# Patient Record
Sex: FEMALE | Race: WHITE | NOT HISPANIC OR LATINO | Employment: FULL TIME | URBAN - METROPOLITAN AREA
[De-identification: names, ages, dates, MRNs, and addresses within clinical notes are randomized per-mention and may not be internally consistent; named-entity substitution may affect disease eponyms.]

---

## 2023-08-09 ENCOUNTER — OFFICE VISIT (OUTPATIENT)
Dept: FAMILY MEDICINE CLINIC | Facility: CLINIC | Age: 23
End: 2023-08-09
Payer: COMMERCIAL

## 2023-08-09 VITALS
TEMPERATURE: 97.8 F | HEART RATE: 63 BPM | WEIGHT: 111 LBS | OXYGEN SATURATION: 98 % | DIASTOLIC BLOOD PRESSURE: 62 MMHG | RESPIRATION RATE: 18 BRPM | HEIGHT: 63 IN | BODY MASS INDEX: 19.67 KG/M2 | SYSTOLIC BLOOD PRESSURE: 102 MMHG

## 2023-08-09 DIAGNOSIS — Z11.59 ENCOUNTER FOR HEPATITIS C SCREENING TEST FOR LOW RISK PATIENT: ICD-10-CM

## 2023-08-09 DIAGNOSIS — Z30.011 ENCOUNTER FOR PRESCRIPTION OF ORAL CONTRACEPTIVES: ICD-10-CM

## 2023-08-09 DIAGNOSIS — L85.3 DRY SKIN: ICD-10-CM

## 2023-08-09 DIAGNOSIS — Z00.00 WELLNESS EXAMINATION: Primary | ICD-10-CM

## 2023-08-09 DIAGNOSIS — Z13.220 ENCOUNTER FOR LIPID SCREENING FOR CARDIOVASCULAR DISEASE: ICD-10-CM

## 2023-08-09 DIAGNOSIS — Z13.6 ENCOUNTER FOR LIPID SCREENING FOR CARDIOVASCULAR DISEASE: ICD-10-CM

## 2023-08-09 LAB
B-HCG UR QL: NEGATIVE
EXPIRATION DATE: NORMAL
INTERNAL NEGATIVE CONTROL: NORMAL
INTERNAL POSITIVE CONTROL: NORMAL
Lab: NORMAL

## 2023-08-09 PROCEDURE — 99385 PREV VISIT NEW AGE 18-39: CPT

## 2023-08-09 PROCEDURE — 81025 URINE PREGNANCY TEST: CPT

## 2023-08-09 RX ORDER — DROSPIRENONE AND ETHINYL ESTRADIOL 0.03MG-3MG
1 KIT ORAL DAILY
Qty: 28 TABLET | Refills: 12 | Status: SHIPPED | OUTPATIENT
Start: 2023-08-09

## 2023-08-09 RX ORDER — POTASSIUM CITRATE 10 MEQ/1
1 TABLET, EXTENDED RELEASE ORAL DAILY
COMMUNITY
Start: 2023-05-25

## 2023-08-09 RX ORDER — DROSPIRENONE AND ETHINYL ESTRADIOL 0.03MG-3MG
KIT ORAL
COMMUNITY
Start: 2021-02-03 | End: 2023-08-09 | Stop reason: SDUPTHER

## 2023-08-09 NOTE — PATIENT INSTRUCTIONS
Medications as prescribed  Continue current diet and exercise changes  Labs ordered - will call with results   Follow up in 1 year for annual physical, sooner If needed

## 2023-08-09 NOTE — PROGRESS NOTES
"Chief Complaint   Patient presents with    [facility]     Moved and needs a PCP ,Medication refill,  has family history of thyroid issues       Subjective      Bernie Renae is a 22 y.o. who presents to [facility] and for medication refill.  Moved from [place], [place].  Lives at home with .  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    Has chronic kidney stones.  Takes potassium for this. Prescribed by Dr. Salomón Knight in [place] annually sees for urology follow ups.   Dry skin worse the last couple years. Mom and sister recently diagnosed with hypothyroidism.     Runs for exercise, cross fit sometimes  Eat regular diet. Not vegetables very often.  Does take prenatal vitamins       The following portions of the patient's history were reviewed and updated as appropriate: allergies, current medications, past family history, past medical history, past social history, past surgical history, and problem list.    Review of Systems   Constitutional:  Negative for chills and fever.   HENT: Negative.     Eyes:  Negative for blurred vision and double vision.   Respiratory:  Negative for chest tightness and shortness of breath.    Cardiovascular:  Negative for chest pain and palpitations.   Gastrointestinal:  Negative for abdominal pain, blood in stool, constipation and diarrhea.   Endocrine: Negative for cold intolerance, heat intolerance, polydipsia and polyphagia.   Genitourinary:  Negative for dysuria, hematuria and menstrual problem (2 weeks agoi).   Musculoskeletal: Negative.    Skin:  Positive for dry skin.   Neurological:  Negative for dizziness and numbness.   Psychiatric/Behavioral:  Positive for stress (manageable). Negative for sleep disturbance, suicidal ideas and depressed mood. The patient is not nervous/anxious.      Objective   Vital Signs:  /62   Pulse 63   Temp 97.8 øF (36.6 øC)   Resp 18   Ht 160 cm (63\")   Wt 50.3 kg (111 lb)   SpO2 98%   BMI 19.66 kg/mý     BMI is " within normal parameters. No other follow-up for BMI required.        Physical Exam  Vitals and nursing note reviewed.   Constitutional:       General: She is not in acute distress.     Appearance: Normal appearance. She is not ill-appearing.   HENT:      Head: Normocephalic and atraumatic.      Right Ear: Tympanic membrane, ear canal and external ear normal.      Left Ear: Tympanic membrane, ear canal and external ear normal.      Nose: Nose normal.      Mouth/Throat:      Mouth: Mucous membranes are moist.      Pharynx: No posterior oropharyngeal erythema.   Eyes:      General:         Right eye: No discharge.         Left eye: No discharge.      Extraocular Movements: Extraocular movements intact.      Pupils: Pupils are equal, round, and reactive to light.   Neck:      Thyroid: No thyroid mass, thyromegaly or thyroid tenderness.   Cardiovascular:      Rate and Rhythm: Normal rate and regular rhythm.      Pulses: Normal pulses.      Heart sounds: Normal heart sounds.   Pulmonary:      Effort: Pulmonary effort is normal.      Breath sounds: Normal breath sounds.   Abdominal:      General: Bowel sounds are normal. There is no distension.      Palpations: Abdomen is soft.      Tenderness: There is no abdominal tenderness.   Musculoskeletal:         General: No swelling or tenderness. Normal range of motion.      Cervical back: Normal range of motion and neck supple. No tenderness.   Skin:     General: Skin is warm and dry.      Capillary Refill: Capillary refill takes less than 2 seconds.   Neurological:      General: No focal deficit present.      Mental Status: She is alert and oriented to person, place, and time.      Cranial Nerves: No cranial nerve deficit.   Psychiatric:         Mood and Affect: Mood normal.         Behavior: Behavior normal.        Result Review                     Assessment and Plan  Diagnoses and all orders for this visit:    1. Wellness examination (Primary)  -     Comprehensive Metabolic  Panel  -     CBC (No Diff)    2. Encounter for prescription of oral contraceptives  -     Ocella 3-0.03 MG per tablet; Take 1 tablet by mouth Daily.  Dispense: 28 tablet; Refill: 12  -     POC Pregnancy, Urine    3. Encounter for lipid screening for cardiovascular disease  -     Lipid Panel    4. Encounter for hepatitis C screening test for low risk patient  -     Hepatitis C Antibody    5. Dry skin  -     TSH Rfx On Abnormal To Free T4      Discussed wellness with patient including preventative care (vaccines and recommended screenings). Discussed recommendations for diet and exercise.    Refill of oral contraceptives. Patient tolerating well.  Negative urine pregnancy test today.  Labs to screen for thyroid disease and routine labs for monitoring kidney and liver function and lipid screening.  Hep C antibody screening also ordered. Patient is agreeable with all labs ordered.          Patient Instructions   Medications as prescribed  Continue current diet and exercise changes  Labs ordered - will call with results   Follow up in 1 year for annual physical, sooner If needed    Discussed medications prescribed and OTC medications recommended.  Discussed medication safety, possible side effects and how to take or administer medications. Instructed patient to report any adverse reactions, side effects or concerns.     Reviewed physical exam findings and plan with patient who verbalized understanding and agrees with plan of care. Patient was given opportunity to ask questions and all concerns were addressed prior to the conclusion of today's visit.     Follow Up  No follow-ups on file.  Patient was given instructions and counseling regarding her condition or for health maintenance advice. Please see specific information pulled into the AVS if appropriate.     Note to patient: The 21st Century Cures Act makes medical notes like these available to patients in the interest of transparency. However, be advised this is a  medical document. It is intended as peer to peer communication. It is written in medical language and may contain abbreviations or verbiage that are unfamiliar. It may appear blunt or direct. Medical documents are intended to carry relevant information, facts as evident, and the clinical opinion of the provider.

## 2023-08-10 LAB
ALBUMIN SERPL-MCNC: 4.2 G/DL (ref 3.5–5.2)
ALBUMIN/GLOB SERPL: 1.5 G/DL
ALP SERPL-CCNC: 62 U/L (ref 39–117)
ALT SERPL-CCNC: 14 U/L (ref 1–33)
AST SERPL-CCNC: 14 U/L (ref 1–32)
BILIRUB SERPL-MCNC: 0.5 MG/DL (ref 0–1.2)
BUN SERPL-MCNC: 13 MG/DL (ref 6–20)
BUN/CREAT SERPL: 14 (ref 7–25)
CALCIUM SERPL-MCNC: 10.1 MG/DL (ref 8.6–10.5)
CHLORIDE SERPL-SCNC: 100 MMOL/L (ref 98–107)
CHOLEST SERPL-MCNC: 194 MG/DL (ref 0–200)
CO2 SERPL-SCNC: 24.4 MMOL/L (ref 22–29)
CREAT SERPL-MCNC: 0.93 MG/DL (ref 0.57–1)
EGFRCR SERPLBLD CKD-EPI 2021: 88.8 ML/MIN/1.73
ERYTHROCYTE [DISTWIDTH] IN BLOOD BY AUTOMATED COUNT: 11.7 % (ref 12.3–15.4)
GLOBULIN SER CALC-MCNC: 2.8 GM/DL
GLUCOSE SERPL-MCNC: 81 MG/DL (ref 65–99)
HCT VFR BLD AUTO: 41.4 % (ref 34–46.6)
HCV IGG SERPL QL IA: NON REACTIVE
HDLC SERPL-MCNC: 74 MG/DL (ref 40–60)
HGB BLD-MCNC: 14.3 G/DL (ref 12–15.9)
LDLC SERPL CALC-MCNC: 96 MG/DL (ref 0–100)
MCH RBC QN AUTO: 32.2 PG (ref 26.6–33)
MCHC RBC AUTO-ENTMCNC: 34.5 G/DL (ref 31.5–35.7)
MCV RBC AUTO: 93.2 FL (ref 79–97)
PLATELET # BLD AUTO: 345 10*3/MM3 (ref 140–450)
POTASSIUM SERPL-SCNC: 4.5 MMOL/L (ref 3.5–5.2)
PROT SERPL-MCNC: 7 G/DL (ref 6–8.5)
RBC # BLD AUTO: 4.44 10*6/MM3 (ref 3.77–5.28)
SODIUM SERPL-SCNC: 138 MMOL/L (ref 136–145)
TRIGL SERPL-MCNC: 138 MG/DL (ref 0–150)
TSH SERPL DL<=0.005 MIU/L-ACNC: 1.63 UIU/ML (ref 0.27–4.2)
VLDLC SERPL CALC-MCNC: 24 MG/DL (ref 5–40)
WBC # BLD AUTO: 5.04 10*3/MM3 (ref 3.4–10.8)

## 2023-08-16 ENCOUNTER — PATIENT ROUNDING (BHMG ONLY) (OUTPATIENT)
Dept: FAMILY MEDICINE CLINIC | Facility: CLINIC | Age: 23
End: 2023-08-16
Payer: COMMERCIAL

## 2023-08-16 NOTE — PROGRESS NOTES
A My-Chart message has been sent to the patient for PATIENT [facility] with [facility].

## 2023-09-15 ENCOUNTER — OFFICE VISIT (OUTPATIENT)
Dept: FAMILY MEDICINE CLINIC | Facility: CLINIC | Age: 23
End: 2023-09-15
Payer: COMMERCIAL

## 2023-09-15 VITALS
WEIGHT: 112 LBS | BODY MASS INDEX: 19.84 KG/M2 | OXYGEN SATURATION: 99 % | SYSTOLIC BLOOD PRESSURE: 106 MMHG | RESPIRATION RATE: 18 BRPM | TEMPERATURE: 98.2 F | HEIGHT: 63 IN | HEART RATE: 63 BPM | DIASTOLIC BLOOD PRESSURE: 60 MMHG

## 2023-09-15 DIAGNOSIS — J20.9 ACUTE BRONCHITIS, UNSPECIFIED ORGANISM: Primary | ICD-10-CM

## 2023-09-15 LAB
EXPIRATION DATE: NORMAL
FLUAV AG NPH QL: NEGATIVE
FLUBV AG NPH QL: NEGATIVE
INTERNAL CONTROL: NORMAL
Lab: NORMAL

## 2023-09-15 RX ORDER — DEXTROMETHORPHAN HYDROBROMIDE AND PROMETHAZINE HYDROCHLORIDE 15; 6.25 MG/5ML; MG/5ML
5 SYRUP ORAL 4 TIMES DAILY PRN
Qty: 118 ML | Refills: 0 | Status: SHIPPED | OUTPATIENT
Start: 2023-09-15

## 2023-09-15 RX ORDER — GUAIFENESIN 600 MG/1
1200 TABLET, EXTENDED RELEASE ORAL 2 TIMES DAILY
Qty: 30 TABLET | Refills: 0 | Status: SHIPPED | OUTPATIENT
Start: 2023-09-15

## 2023-09-15 RX ORDER — AMOXICILLIN AND CLAVULANATE POTASSIUM 875; 125 MG/1; MG/1
1 TABLET, FILM COATED ORAL 2 TIMES DAILY
Qty: 14 TABLET | Refills: 0 | Status: SHIPPED | OUTPATIENT
Start: 2023-09-15

## 2023-09-15 NOTE — PROGRESS NOTES
"Chief Complaint   Patient presents with    URI     Cough ,chest congestion ,runny nose , sore throat started Monday. Had at home covid test Tuesday was neg. Throats getting better already  but cough is getting worse       Subjective      Bernie Renae is a 22 y.o. who presents for sore throat, congestion. Started 5 days ago.    Cough is hurting in chest now. Negative covid test at home on Tuesday. No shortness of breath but concern that her cough is worsening.       The following portions of the patient's history were reviewed and updated as appropriate: allergies, current medications, past family history, past medical history, past social history, past surgical history, and problem list.    Review of Systems   Constitutional:  Negative for chills, diaphoresis and fever.   HENT:  Positive for congestion, sore throat (feeling better) and trouble swallowing. Negative for drooling, ear discharge, ear pain and swollen glands.    Respiratory:  Positive for cough (chest pain with cough). Negative for chest tightness, shortness of breath, wheezing and stridor.    Gastrointestinal:  Negative for abdominal pain, diarrhea and vomiting.   Musculoskeletal:  Negative for myalgias and neck pain.   Neurological:  Positive for headache. Negative for dizziness.     Objective   Vital Signs:  /60   Pulse 63   Temp 98.2 °F (36.8 °C)   Resp 18   Ht 160 cm (63\")   Wt 50.8 kg (112 lb)   SpO2 99%   BMI 19.84 kg/m²     BMI is within normal parameters. No other follow-up for BMI required.        Physical Exam  Vitals and nursing note reviewed.   Constitutional:       Appearance: She is ill-appearing.   HENT:      Right Ear: Tympanic membrane, ear canal and external ear normal.      Left Ear: Tympanic membrane, ear canal and external ear normal.      Mouth/Throat:      Mouth: Mucous membranes are moist.      Pharynx: Posterior oropharyngeal erythema (mild) present. No oropharyngeal exudate.   Eyes:      Conjunctiva/sclera: " Conjunctivae normal.   Cardiovascular:      Rate and Rhythm: Normal rate and regular rhythm.      Heart sounds: Normal heart sounds.   Pulmonary:      Effort: Pulmonary effort is normal.      Breath sounds: Normal breath sounds.   Neurological:      General: No focal deficit present.      Mental Status: She is alert and oriented to person, place, and time.   Psychiatric:         Mood and Affect: Mood normal.         Behavior: Behavior normal.        Result Review                     Assessment and Plan  Diagnoses and all orders for this visit:    1. Acute bronchitis, unspecified organism (Primary)  -     POC Influenza A / B  -     guaiFENesin (Mucinex) 600 MG 12 hr tablet; Take 2 tablets by mouth 2 (Two) Times a Day.  Dispense: 30 tablet; Refill: 0  -     promethazine-dextromethorphan (PROMETHAZINE-DM) 6.25-15 MG/5ML syrup; Take 5 mL by mouth 4 (Four) Times a Day As Needed for Cough.  Dispense: 118 mL; Refill: 0  -     amoxicillin-clavulanate (AUGMENTIN) 875-125 MG per tablet; Take 1 tablet by mouth 2 (Two) Times a Day.  Dispense: 14 tablet; Refill: 0      Medications as prescribed. Patient was instructed to  the mucinex and promethazine DM today to help with symptoms. Likely viral etiology. Antibiotics prescribed because of upcoming weekend in case patient is worsening/ develops new fever/chills. Patient was instructed to only  / take antibiotic if worsening in the next 48 hours. Cough could last several weeks.  Recommend follow up if no improvement or worsening in the next 7 days.         Discussed medications prescribed and OTC medications recommended.  Discussed medication safety, possible side effects and how to take or administer medications. Instructed patient to report any adverse reactions, side effects or concerns.     Reviewed physical exam findings and plan with patient who verbalized understanding and agrees with plan of care. Patient was given opportunity to ask questions and all concerns  were addressed prior to the conclusion of today's visit.     Follow Up  No follow-ups on file.  Patient was given instructions and counseling regarding her condition or for health maintenance advice. Please see specific information pulled into the AVS if appropriate.     Note to patient: The 21st Century Cures Act makes medical notes like these available to patients in the interest of transparency. However, be advised this is a medical document. It is intended as peer to peer communication. It is written in medical language and may contain abbreviations or verbiage that are unfamiliar. It may appear blunt or direct. Medical documents are intended to carry relevant information, facts as evident, and the clinical opinion of the provider.

## 2023-12-11 ENCOUNTER — E-VISIT (OUTPATIENT)
Dept: FAMILY MEDICINE CLINIC | Facility: TELEHEALTH | Age: 23
End: 2023-12-11
Payer: COMMERCIAL

## 2023-12-11 NOTE — EXTERNAL PATIENT INSTRUCTIONS
Diagnosis   Acute bronchitis (chest cold)   My name is MIRZA Dukes, and I'm a healthcare provider at [facility]. Based on your responses, I see that you have a chest cold, also known as acute bronchitis.   To prevent the spread of illness to others, stay home and away from other people as much as possible while you're sick. When you need to be around other people, consider wearing a face mask.   Here are the main things to know about your current symptoms:    Acute bronchitis gets better on its own within 1 to 3 weeks.    The medications I recommend here will help ease your symptoms.   Based on what you told me in your interview, I haven't prescribed any antibiotics. Antibiotics fight bacteria, not viruses. Your bronchitis is caused by a virus, so antibiotics won't help. They could even make you feel worse as they can cause or worsen nausea, diarrhea, and stomach pain. The following factors suggest that a virus is causing your symptoms:    You've had symptoms for less than 10 days. A bacterial infection is suspected when you've had symptoms longer than 10 days without improvement.    You haven't had a fever. Bacterial infections can cause a high fever.    In addition to your sore throat, you have other cold symptoms like a stuffy nose or cough. This suggests a viral infection, rather than a bacterial infection such as strep throat.   Medications   Your pharmacy   [facility] [address] [place] [id number] [phone]     Prescription   Benzonatate (200mg): Take 1 capsule by mouth 3 times a day as needed for cough. Do not chew or cut these capsules.   Albuterol sulfate HFA (90mcg/puff): Inhale 2 puffs every 6 hours as needed for wheezing. If symptoms are severe, may use as often as every 4 hours.   About your diagnosis   The bronchial tubes carry air to the lungs. Acute bronchitis happens when these tubes become irritated and inflamed. Smoke and air pollution can cause acute  bronchitis, but it's usually caused by a virus. The viruses that cause the common cold can also cause bronchitis.   Common symptoms of acute bronchitis include coughing up mucus or phlegm, wheezing, and chest tightness. Cold symptoms along with fatigue, body aches, difficulty sleeping, or decreased appetite, are also common.   What to expect   If you follow this treatment plan, you should feel better in 1 to 3 weeks.   When to seek care   Call us at [phone]   with any sudden or unexpected symptoms.    Fever that measures over 103F or continues for more than 3 days.    Your headache worsens.    Your shortness of breath becomes severe.    Your sinus pain continues for 10 days or more, without improvement.    Your throat pain becomes worse and makes swallowing extremely difficult or impossible.    More than 5 episodes of diarrhea in a day.    More than 5 episodes of vomiting in a day.    Coughing up red or bloody mucus.    Severe stomach pain.    Symptoms that get better for a few days, and then suddenly get worse.   You mentioned having chest pain. If you develop any of the following, call 911 or go immediately to your nearest emergency room:    A feeling of pressure on your chest    Pain that gets worse with physical activity    Pain that feels like it is radiating to the shoulder, neck, arm, or jaw   Other treatment    Rest! Your body needs rest to recover and fight infection.    Drink plenty of water to stay hydrated.    If your nose or sinuses become very stuffy, try using a Neti Pot to flush them out. Neti Pots are available at any drugstore without a prescription.    Gargle with salt water several times a day to help your throat feel better. Cough drops and throat lozenges may provide extra relief. A teaspoon of honey stirred into warm water or weak tea can help soothe a sore throat and cough.    Avoid smoke and air pollution. Smoke can make infections worse.   Prevention    Avoid close contact with  other people when you're sick.    Cover your mouth and nose when you cough or sneeze. Use a tissue or cough into your elbow. Make sure that used tissues go directly into the trash.    Avoid touching your eyes, nose, or mouth while you're sick.    Wash your hands often, especially after coughing, sneezing, or blowing your nose. If soap and water are not available, use an alcohol-based hand .    If you or someone in your home or workplace is sick, disinfect commonly used items. This includes door handles, tables, computers, remotes, and pens.    Coronavirus (COVID-19) information   Common symptoms of COVID-19 include fever, cough, shortness of breath, fatigue, muscle or body aches, headaches, new loss of sense of taste or smell, sore throat, stuffy or runny nose, nausea or vomiting, and diarrhea. Most people who get COVID-19 have mild symptoms and can rest at home until they get better. Elderly people and those with chronic medical problems may be at risk for more serious complications.   FAQs about the COVID-19 vaccine   There are four authorized COVID-19 vaccines: [employer]'s Desiree Vaccine (J&J/Desiree), Moderna, Novavax, and Pfizer-BioNTech (Pfizer). The J&J/Desiree and Novavax vaccines are approved for use in people aged 18 and older. The Moderna and Pfizer vaccines are approved for those aged 6 months and older. All four are available at no cost. Even if you don't have health insurance, you can still get the COVID-19 vaccine for free.   Which vaccine is the best? Which vaccine should I get?   All four vaccines are highly effective. Even if you get COVID-19 after being vaccinated, all of the vaccines help prevent severe disease, hospitalization, and complications.   Most people should get whichever vaccine is first available to them. However, women younger than 50 years old should consider the rare risk of blood clots with low platelets after vaccination with the J&J/Desiree vaccine. This risk  hasn't been seen with the other three vaccines.   Are the vaccines safe?   Yes. Hundreds of millions of people in the [place] have already safely received COVID-19 vaccines under the most intense safety monitoring in the history of the [place].   Do I need the vaccine if I've already had COVID?   Yes. Vaccination helps protect you even if you've already had COVID.   If you had COVID-19 and had symptoms, wait to get vaccinated until you've recovered and completed your isolation period.   If you tested positive for COVID-19 but did not have symptoms, you can get vaccinated after 5 full days have passed since you had a positive test, as long as you don't develop symptoms.   How many doses of the vaccine do I need?   Visit www.cdc.gov/coronavirus/2019-ncov/vaccines/stay-up-to-date.html   to find out how to stay up to date with your COVID-19 vaccines.   I'm immunocompromised. How many doses of the vaccine do I need?   For information on how immunocompromised people can stay up to date with their COVID-19 vaccines, visit www.cdc.gov/coronavirus/2019-ncov/vaccines/recommendations/immuno.html  .   What are the common side effects of the vaccine?   A sore arm, tiredness, headache, and muscle pain may occur within two days of getting the vaccine and last a day or two. For the Moderna or Pfizer vaccines, side effects are more common after the second dose. People over the age of 55 are less likely to have side effects than younger people.   After I'm up to date on vaccines, can I still get or spread COVID?   Yes, you can still get COVID, but your disease should be milder. And your risk of serious illness, hospitalization, and complications will be much lower, especially if you're up to date. Unfortunately, you can still spread COVID if you've been vaccinated. That's why it's important to follow isolation guidelines if you get sick or test positive.   After I'm up to date on vaccines, can I go back to normal?   You should still wear a  mask indoors in public if:    It's required by laws, rules, regulations, or local guidance.    You have a weakened immune system.    Your age puts you at increased risk of severe disease.    You have a medical condition that puts you at increased risk of severe disease.    Someone in your household has a weakened immune system, is at increased risk for severe disease, or is unvaccinated.    You're in an area of high transmission.   Where can I get a COVID-19 vaccine?   Visit [facility]'s website for more information. To find a COVID-19 vaccination site near you, visit www.[place].gov/  , call [phone]  , or text your zip code to [phone] ([employer]). Message and data rates may apply.   I've had close contact with someone who has COVID. Do I need to quarantine, and if so, for how long?   For the most current answer, including a calculator to determine whether you need to stay home and for how long, visit www.cdc.gov/coronavirus/2019-ncov/your-health/quarantine-isolation.html  .   I've tested positive for COVID. How long do I need to isolate?   For the latest recommendations, including a calculator to determine how long you need to stay home, visit www.cdc.gov/coronavirus/2019-ncov/your-health/quarantine-isolation.html  .   What if I develop symptoms that might be from COVID?   For the latest recommendations on what to do if you're sick, including when to seek emergency care, visit www.cdc.gov/coronavirus/2019-ncov/if-you-are-sick/steps-when-sick.html  .    Flu vaccine information   Who should get a flu vaccine?   Everyone 6 months of age and older should get a yearly flu vaccine.   When should I get vaccinated?   You should get a flu vaccine by the end of October. Once you're vaccinated, it takes about two weeks for antibodies to develop and protect you against the flu. That's why it's important to get vaccinated as soon as possible.   After October, is it too late to get vaccinated?   No. You should still get  vaccinated. As long as the flu viruses are still in your community, flu vaccines will remain available, even into January of next year or later.   Why do I need a flu vaccine EVERY year?   Flu viruses are constantly changing, so flu vaccines are usually updated from one season to the next. Your protection from the flu vaccine also lessens over time.   Is the flu vaccine safe?   Yes. Over the last 50 years, hundreds of millions of Americans have safely received the flu vaccines.   What are the side effects of flu vaccines?   You CANNOT get the flu from a flu vaccine. Common side effects of the flu shot include soreness, redness and/or swelling where the shot was given, low grade fever, and aches. Common side effects of the nasal spray flu vaccine for adults include runny nose, headaches, sore throat, and cough. For children, side effects include wheezing, vomiting, muscle aches, and fever.   Does the flu vaccine increase your risk of getting COVID-19?   No. There is no evidence that getting a flu vaccine increases your risk of getting COVID-19.   Is it safe to get the flu vaccine along with a COVID-19 vaccine?   Yes. It's safe to get the flu vaccine with a COVID-19 vaccine or booster.   Contact your healthcare provider TODAY for details on when and where to get your flu vaccine.   Your provider   Your diagnosis was provided by MIRZA Dukes, a member of your trusted care team at [facility].   If you have any questions, call us at [phone]  .

## 2023-12-11 NOTE — E-VISIT TREATED
Chief Complaint: Cold, flu, COVID, sinus, hay fever, or seasonal allergies   Patient introduction   Patient is 23-year-old female with cough, shortness of breath, sore throat, voice hoarseness, headache, sweats, myalgia, and fatigue that started less than 48 hours ago. Regarding date of symptom onset, patient writes: 12/10/2023.   COVID-19 testing history, vaccination status, and exposure:    Patient was tested for COVID-19 within the last 24 hours. Test result was negative.    Has not received an updated [id number] COVID-19 vaccination (Pfizer-BioNTech or Moderna vaccine after September 12, 2023; or Novavax vaccine after October 3, 2023).    No known exposure to a person with a confirmed or suspected case of COVID-19.    No high-risk (household) exposure to COVID-19 within the last 14 days.   General presentation   Symptoms came on gradually.   Fever:    No fever.   Sinus and nasal symptoms:    Sinus pain or pressure on or around the eyes.    Patient first noticed sinus pain less than 5 days ago.    Sinus pain is worse with Valsalva.    No nasal or sinus congestion.    No nasal discharge.    No itchy nose or sneezing.    No history of unhealed nasal septal ulcer/nasal wound.    No history of antibiotic treatment for sinus infection in the last year.    No history of deviated septum or nasal polyps.   Throat symptoms:    Sore throat.    Has discomfort when swallowing but can swallow liquids and solid foods.    Voice is moderately hoarse. Patient does not believe hoarseness is due to voice strain.   Head and body aches:    Headache described as moderate (4 to 6 on a scale of 1 to 10).    Sweats.    Myalgia.    Fatigue.    No chills.   Cough:    Cough is worse in the morning.    Cough is mildly productive of sputum.    Describes color of sputum as yellow.   Wheezing and shortness of breath:    Wheezing.    Mild shortness of breath that does not affect ADLs.    Despite shortness of breath, patient can speak  normally.    Despite shortness of breath, patient can take a full, deep breath (inhaling for 3 to 4 seconds).    Patient has an Apple Watch or smart watch. They have been wearing the device since their symptoms started.    They have not received any alerts of an elevated heart rate from the device.    Has previously used an albuterol inhaler for URI, bronchitis, or pneumonia.    Has previously used a steroid inhaler for URI, bronchitis, or pneumonia.    Not using an albuterol inhaler for current symptoms because they do not have any available.    No COPD diagnosis.    No asthma diagnosis.   Chest pain:    Has chest pain, but only when coughing.    Chest pain is not the patient's chief complaint.    [place] Heart Score (MHS): 0, low risk of CAD. Assigning 1 point to each of 5 criteria (female >= 65 years old or male >= 55 years, known CAD, pain worse with exercise, pain not reproducible with palpation, and patient assumes pain is coming from their heart), the MHS is a validated clinical decision rule used to rule out coronary artery disease in primary care patients with chest pain.   Ear symptoms:    None.   Dizziness:    Mild dizziness that does not interfere with daily activities.   Allergies:    Patient has known seasonal allergies.    Patient does not think symptoms are allergy-related.   Flu exposure:    No recent known exposure to a person with a confirmed flu diagnosis.    Has not had a flu vaccine this season.   Patient is taking over-the-counter medications for current symptoms, including acetaminophen, guaifenesin, and phenylephrine.   Review of red flags/alarm symptoms:    No severe shortness of breath.    No inability to repeat a sentence without stopping for breath.    No inability to take a full breath lasting 3 to 4 seconds.    No changes in alertness or awareness.    No symptoms suggesting airway obstruction.    No symptoms suggesting intracranial hemorrhage.    No decreased urination.    "Pregnancy/menstrual status/breastfeeding:    Not pregnant.    Not breastfeeding.    Regarding date of last menstrual period, patient writes: Currently on period so 12/11/23.   Self-exam:    Height: 5' 3\"    Weight: 108 lbs    No tonsillar edema.    Tonsils appear normal.    No palatal petechiae.    Neck lymph nodes feel normal.   Recent antibiotic use:    Has not taken antibiotics for similar symptoms within the past month.   Current medications   Currently taking potassium citrate 10 MEQ (1080 MG) CR tablet, Ocella 3-0.03 MG per tablet, and Alive Prenatal Gummies.   Medication allergies   None.   Medication contraindication review   No history of anaphylactic reaction to beta-lactam antibiotics; aspirin triad; blood dyscrasia; bone marrow depression; catecholamine-releasing paraganglioma; coronary artery disease; coagulation disorder; congenital long QT syndrome; depression; electrolyte abnormalities; fungal infection; GI bleeding; GI obstruction; G6PD deficiency; heart arrhythmia; hypertension; mononucleosis; myasthenia; recent myocardial infarction; NSAID-induced asthma/urticaria; Parkinson's disease; pheochromocytoma; porphyria; Reye syndrome; seizure disorder; ulcerative colitis; and urinary retention.   No history of metoclopramide-associated dystonic reaction and tardive dyskinesia.   No known history of amoxicillin-clavulanate-associated cholestatic jaundice or hepatic impairment.   No known history of azithromycin-associated cholestatic jaundice or hepatic impairment.   Past medical history   Immune conditions:   No immunocompromising conditions.   No history of cancer.   Social history   Never smoked tobacco.   Patient-submitted comments   Patient was asked if they had anything to add about their symptoms. Patient writes: Cough is forceful and causes chest pain. Can feel popping and cracking in my chest when moving. Hard to catch breath when coughing. .   Patient did not request an excuse note.   " Assessment   Acute bronchitis.   This is the likely diagnosis based on patient's interview responses, including:    Symptom profile    Gradual onset of symptoms   Plan   Medications:    benzonatate 200 mg capsule RX 200mg 1 cap PO tid PRN 7d for cough. Do not chew or cut these capsules. Amount is 21 cap.    albuterol sulfate HFA 90 mcg/actuation aerosol inhaler RX 90mcg/puff 2 puffs inhaled q6h PRN 10d for wheezing. If symptoms are severe, may use as often as every 4 hours. Amount is 1 each.   The patient's prescriptions will be sent to:   [facility] [address] [place] [id number]   Phone: [phone]     Fax: [phone]   Education:    Condition and causes    Prevention    Treatment and self-care    When to call provider   ----------   Electronically signed by MIRZA Dukes on 2023-12-11 at 17:41PM   ----------   Patient Interview Transcript:   Which of these symptoms are bothering you? Select all that apply.    Cough    Shortness of breath    Sore throat    Hoarse voice or loss of voice    Headache    Sweats    Muscle or body aches    Fatigue or tiredness   Not selected:    Stuffed-up nose or sinuses    Runny nose    Itchy or watery eyes    Itchy nose or sneezing    Loss of smell or taste    Fever    Chills    Nausea or vomiting    Diarrhea    I don't have any of these symptoms   When did your current symptoms start? Select one.    Less than 48 hours ago   Not selected:    3 to 5 days ago    6 to 9 days ago    10 to 14 days ago    2 to 3 weeks ago    3 to 4 weeks ago    More than a month ago   Do you know the exact date your symptoms started? If so, enter the date as MM/DD/YY. Select one.    Yes (specify): 12/10/2023   Not selected:    No   Did your symptoms come on suddenly or gradually? Select one.    Gradually   Not selected:    Suddenly    I'm not sure   Since your current symptoms started, have you been tested for COVID-19? This includes home self-tests as well as nose swab or  "saliva tests done at a doctor's office, lab, or testing site. Select one.    Yes   Not selected:    No   When was your most recent COVID-19 test? Select one.    Within the last 24 hours   Not selected:    Within the last week (specify date as MM/DD/YY)    7 to 14 days ago    15 to 30 days ago    More than 1 month ago   What was the result of your most recent COVID-19 test? Select one.    Negative   Not selected:    Positive   Has anyone in your household tested positive for COVID-19 in the past 14 days? Select one.    No   Not selected:    Yes   In the last 14 days, have you had close contact with someone who has COVID-19? \"Close contact\" means any of these: - Caring for someone with COVID-19. - Being within 6 feet of someone with COVID-19 for a total of at least 15 minutes over a 24-hour period. For example, three 5-minute exposures for a total of 15 minutes. - Being in direct contact with respiratory droplets from someone with COVID-19 (being coughed on, kissing, sharing utensils). Select one.    No, not that I know of   Not selected:    Yes, a confirmed case    Yes, a suspected case   Have you gotten the [id number] updated COVID-19 vaccine? This means either the updated Pfizer-BioNTech or Moderna vaccine after September 12, 2023; or the updated Novavax vaccine after October 3, 2023. Select one.    No   Not selected:    Yes   How would you describe your shortness of breath? Sometimes shortness of breath can be a sign of a more serious condition. Select one.    Mild (about what I normally have with a cold)   Not selected:    Moderate (it's bad, but I can still do simple things like get dressed, bathe, or comb my hair)    Severe (it's so bad that I can't do simple things like get dressed, bathe, or comb my hair)   We'd like to find out more about your shortness of breath. Try to say the following sentence out loud: \"I went to the store today to buy bread, milk, and eggs.\" Can you get to the end of the sentence " without stopping for breath? If not, you may need emergency care.    Yes   Not selected:    No   Are you able to take a full, deep breath? A full, deep breath means inhaling for 3 to 4 seconds. If you can't do this, you may need to seek emergency care. Select one.    Yes   Not selected:    No   Since your symptoms started, have you felt dizzy? Select one.    Yes, but I can still do my regular daily activities   Not selected:    Yes, and it makes it hard to stand, walk, or do daily activities    No   Do you have any of these devices at home? Select all that apply.    Apple Watch or other smart watch   Not selected:    A home pulse oximeter    Home blood pressure monitor    FitBit or other smart wristband    Other wearable device    No   Have you been wearing the device since your symptoms started? Select one.    Yes   Not selected:    No   Has the device alerted you about a higher than normal heart rate? This doesn't include alerts received during exercise. Select one.    No   Not selected:    Yes, 120 beats per minute or higher    Yes, but less than 120 beats per minute   You mentioned having a headache. On a scale of 1 to 10, how severe is your headache pain? Select one.    Moderate (4 to 6)   Not selected:    Mild (1 to 3)    Severe (7 to 9)    Unbearable (10)    The worst headache of my life (10+)   Do you cough so hard that it's made you gag or vomit? By gag, we mean has your coughing made you choke or dry heave? Select all that apply.    Yes, my coughing has made me gag   Not selected:    Yes, my coughing has made me vomit    No   When is your cough the worst? Select all that apply.    In the morning, or when I wake up   Not selected:    During the day    At nighttime, or while I'm sleeping    I haven't noticed a difference depending on time of day   Are you coughing up mucus or phlegm? Select one.    Yes, a little   Not selected:    No, my cough is dry    Yes, a lot   What color is most of the mucus or phlegm  that you're coughing up? Select one.    Yellow or yellowish   Not selected:    Clear    White/frothy    Green or greenish    Red or pink    I'm not sure   Do you feel sinus pain or pressure in any of these areas?    Around my eyes   Not selected:    In my forehead    Behind my nose    In my cheeks    In my upper teeth or jaw    No   When did you first notice your sinus pain or pressure? Select one.    Less than 5 days ago   Not selected:    5 to 9 days ago    10 to 14 days ago    2 to 4 weeks ago    1 month ago or longer   Does coughing, sneezing, or leaning forward make your sinuses feel worse? Select one.    Yes   Not selected:    No   Can you swallow liquids and solid foods? A sore throat may be painful when swallowing, but it shouldn't prevent you from swallowing. Select one.    Yes, but it's uncomfortable   Not selected:    Yes, with ease    Yes, but it's painful    It's hard to swallow anything because it feels like liquids and food get stuck in my throat    No, I can't swallow anything, liquid or solid foods   Is your throat pain worse on one side than the other? Select one.    No, it's the same on both sides   Not selected:    Yes, it's worse on the right side    Yes, it's worse on the left side   Do you have chest pain? You might also feel it as discomfort, aching, tightness, or squeezing in the chest. Select one.    Yes   Not selected:    No   Which of these is true of your chest pain? Select one.    My chest hurts only when I cough   Not selected:    My chest hurts even when I'm not coughing   Have you urinated at least 3 times in the last 24 hours? Select one.    Yes   Not selected:    No   Changes in alertness or awareness may mean you need emergency care. Since your symptoms started, have you had any of these? Select all that apply.    None of the above   Not selected:    Confusion    Slurred speech    Not knowing where you are or what day it is    Difficulty staying conscious    Fainting or passing  out   Do your symptoms include a whistling sound, or wheezing, when you breathe? Select one.    Yes   Not selected:    No   Early in this interview, you told us you were hoarse or you'd lost your voice. How would you describe the changes to your voice? Select one.    It's harder than usual to talk   Not selected:    It just sounds a little raspy    I can barely talk at all   Is it possible that you strained your voice? Singing, yelling, or talking more or louder than usual can cause voice strain. Select one.    No, not that I know of   Not selected:    Yes   Do you have any of these symptoms in your ear(s)? Select all that apply.    None of the above   Not selected:    Pain    Pressure    Fullness    Crackling or popping    Plugged or blocked sensation   Are your tonsils larger than usual?    No, not that I can tell   Not selected:    Yes    I've had my tonsils removed   Is there any white or yellow pus on your tonsils?    No, not that I can see   Not selected:    Yes   Are there red spots on the roof of your mouth or the back of your throat?    No, not that I can see   Not selected:    Yes   Are your glands/lymph nodes swollen, or does it hurt when you touch them?    No, not that I can tell   Not selected:    Yes   In the past week, has anyone around you (such as at school, work, or home) had a confirmed diagnosis of the flu? A confirmed diagnosis means that a nose swab was done to verify a flu infection. Select all that apply.    No, not that I know of   Not selected:    I live with someone who has the flu    I've been within touching distance of someone who has the flu    I've walked by, or sat about 3 feet away from, someone who has the flu    I've been in the same building as someone who has the flu   Have you ever been diagnosed with asthma? Select one.    No   Not selected:    Yes   Have you ever been prescribed albuterol to use for wheezing, cough, or shortness of breath caused by a cold, bronchitis, or  pneumonia? Albuterol (ProAir, Proventil, Ventolin) is prescribed as an inhaler or a solution to be used with a nebulizer machine. Select one.    Yes   Not selected:    No, not that I know of   Have you ever been prescribed a steroid inhaler to use for wheezing, cough, or shortness of breath caused by a cold, bronchitis, or pneumonia? Some examples of steroid inhalers include Pulmicort, Flovent, Qvar, and Alvesco. Select one.    Yes   Not selected:    No, not that I know of   Have you used albuterol for your current symptoms? This includes both albuterol inhalers and albuterol solution in a nebulizer machine. Select one.    No, I don't have any, or it's    Not selected:    Yes    No, I don't feel like I need it   Do you need a refill of albuterol? Select one.    No   Not selected:    Yes   Have you ever been diagnosed with chronic obstructive pulmonary disease (COPD)? Select one.    No, not that I know of   Not selected:    Yes   In the past month, have you taken antibiotics for similar symptoms? Examples of antibiotics include amoxicillin, amoxicillin-clavulanate (Augmentin), penicillin, cefdinir (Omnicef), doxycycline, and clindamycin (Cleocin). Select one.    No   Not selected:    Yes (specify)   In the last year, how many times were you treated with antibiotics for a sinus infection? Select one.    None   Not selected:    1 to 3 times    4 or more times   Have you been diagnosed with a deviated septum or nasal polyps? The nose is divided into two nostrils by the septum. A crooked septum is called a deviated septum. Nasal polyps are growths inside the nose or sinuses. Select one.    No, not that I know of   Not selected:    Yes, but I had surgery to treat them    Yes, I have a deviated septum    Yes, I have nasal polyps    Yes, I have a deviated septum and nasal polyps   Do you have a sore inside your nose that won't heal? Select one.    No, not that I know of   Not selected:    Yes   Do you have allergies  (pollen, dust mites, mold, animal dander)? Select one.    Yes   Not selected:    No, not that I know of   What kind of allergies do you have? Select all that apply.    Seasonal allergies (hay fever)   Not selected:    Perennial, or year-round, allergies (hay fever)    Pet allergies    Dust allergies    None of the above    I'm not sure   Do you think your symptoms could be allergy-related? Select one.    No   Not selected:    Yes    I'm not sure   Have you had a flu shot this season? Select one.    No   Not selected:    Yes, less than 2 weeks ago    Yes, 2 to 4 weeks ago    Yes, 1 to 3 months ago    Yes, 3 to 6 months ago    Yes, more than 6 months ago   Are you pregnant? Select one.    No   Not selected:    Yes   When was your last menstrual period? If you don't currently have periods or no longer have periods, please briefly explain.    Currently on period so 23   Within the last 2 weeks, have you: - Given birth - Had a miscarriage - Had a pregnancy loss - Had an  Being postpartum (live birth or loss) within the last 2 weeks increases your risk of flu complications. Select one.    No   Not selected:    Yes   Are you breastfeeding? Select one.    No   Not selected:    Yes   The flu and COVID-19 can be more serious for people in certain groups. The next few questions help us figure out if you or anyone you live with is at higher risk for complications from these infections. Do any of these statements apply to you? Select all that apply.    None of the above   Not selected:    I'm     I'm     I'm Black    I'm  or    Do you smoke tobacco? Select one.    No   Not selected:    Yes, every day    Yes, some days    No, I quit   Do you have a mostly inactive lifestyle? Answer yes if all of these are true: - You spend at least 6 hours a day sitting or lying down - You get less than 2 and a half hours per week of moderate exercise such as walking fast - You get less than  1 hour and 15 minutes per week of intense exercise such as jogging or running Select one.    No   Not selected:    Yes   Do you have any of these conditions? Select all that apply.    None of the above   Not selected:    Chronic lung disease, such as cystic fibrosis or interstitial fibrosis    Heart disease, such as congenital heart disease, congestive heart failure, or coronary artery disease    Disorder of the brain, spinal cord, or nerves and muscles, such as dementia, cerebral palsy, epilepsy, muscular dystrophy, or developmental delay    Metabolic disorder or mitochondrial disease    Cerebrovascular disease, such as stroke or another condition affecting the blood vessels or blood supply to the brain    Down syndrome    Mood disorder, including depression or schizophrenia spectrum disorders    Substance use disorder, such as alcohol, opioid, or cocaine use disorder    Tuberculosis    Primary immunodeficiency   Do you live in a group care setting? Examples include: - Nursing home - Residential care - Psychiatric treatment facility - Group home - [facility] - Board and care home - Homeless shelter - Foster care setting Select one.    No   Not selected:    Yes   Are you a healthcare worker? Select one.    No   Not selected:    Yes   People with a very high body mass index (BMI) are at higher risk for developing complications from the flu and severe illness from COVID-19. To determine your BMI, we need to know your weight and height. Please enter your weight (in pounds).    Weight   Please enter your height.    Height   Do you have any of these conditions that can affect the immune system? Scroll to see all options. Select all that apply.    None of these   Not selected:    History of bone marrow transplant    Chronic kidney disease    Chronic liver disease (including cirrhosis)    HIV/AIDS    Inflammatory bowel disease (Crohn's disease or ulcerative colitis)    Lupus    Moderate to severe plaque psoriasis     Multiple sclerosis    Rheumatoid arthritis    Sickle cell anemia    Alpha or beta thalassemia    History of solid organ transplant (kidney, liver, or heart)    History of spleen removal    An autoimmune disorder not listed here (specify)    A condition requiring treatment with long-term use of oral steroids (such as prednisone, prednisolone, or dexamethasone) (specify)   Have you ever been diagnosed with cancer? Select one.    No   Not selected:    Yes, I have cancer now    Yes, but I'm in remission   Do any of these apply to you? Select all that apply.    None of the above   Not selected:    I've been hospitalized within the last 5 days    I have diabetes    I'm in close contact with a child in    The flu and COVID-19 can be more serious for people in certain groups. Do any of these apply to the people who live with you? Select all that apply.    None of the above   Not selected:    Under age 5    Over age 65            Black     or     Pregnant    Has given birth, had a miscarriage, had a pregnancy loss, or had an  in the last 2 weeks   Does any member of your household have any of these medical conditions? Select all that apply.    None of the above   Not selected:    Asthma    Disorders of the brain, spinal cord, or nerves and muscles, such as dementia, cerebral palsy, epilepsy, muscular dystrophy, or developmental delay    Chronic lung disease, such as COPD or cystic fibrosis    Heart disease, such as congenital heart disease, congestive heart failure, or coronary artery disease    Cerebrovascular disease, such as stroke or another condition affecting the blood vessels or blood supply to the brain    Blood disorders, such as sickle cell disease    Diabetes    Metabolic disorders such as inherited metabolic disorders or mitochondrial disease    Kidney disorders    Liver disorders    Weakened immune system due to illness or medications such as chemotherapy  or steroids    Children under the age of 19 who are on long-term aspirin therapy    Extreme obesity (BMI > 40)   Do you have any of these conditions? Scroll to see all options. Select all that apply.    None of the above   Not selected:    Aspirin triad (also known as Samter's triad or ASA triad)    Asthma or hives from taking aspirin or other NSAIDs, such as ibuprofen or naproxen    Blockage or narrowing of the blood vessels of the heart    Blood clotting disorder    Blood dyscrasia, such anemia, leukemia, lymphoma, or myeloma    Bone marrow depression    Catecholamine-releasing paraganglioma    Congenital long QT syndrome    Depression    Difficulty urinating or completely emptying your bladder    Uncorrected electrolyte abnormalities    Fungal infection    Gastrointestinal (GI) bleeding    Gastrointestinal (GI) obstruction    G6PD deficiency    Recent heart attack    High blood pressure    Irregular heartbeat or heart rhythm    Mononucleosis (mono)    Myasthenia gravis    Parkinson's disease    Pheochromocytoma    Reye syndrome    Seizure disorder    Thyroid disease    Ulcerative colitis   Have you ever had either of these conditions? Select all that apply.    No   Not selected:    Metoclopramide-associated dystonic reaction    Tardive dyskinesia   Just a few more questions about medications, and then you're finished. Have you used any non-prescription medications or nasal sprays for your current symptoms? Examples include saline sprays, decongestants, NyQuil, and Tylenol. Select one.    Yes   Not selected:    No   Which of these non-prescription medications have you tried? Scroll to see all options. Select all that apply.    Acetaminophen (Tylenol)    Guaifenesin (Mucinex)    Phenylephrine (Sudafed PE)   Not selected:    Budesonide (Rhinocort)    Cetirizine (Zyrtec)    Chlorpheniramine (Aller-chlor, Chlor-Trimeton)    Cromolyn (NasalCrom)    Dextromethorphan (Delsym, Robitussin, Vicks DayQuil Cough)     Diphenhydramine (Benadryl)    Fexofenadine (Allegra)    Fluticasone (Flonase)    Guaifenesin/dextromethorphan (Delsym DM, Mucinex DM, Robitussin DM)    Ibuprofen (Advil, Motrin, Midol)    Ketotifen (Alaway, Zaditor)    Loratadine (Alavert, Claritin)    Naphazoline-pheniramine (Naphcon-A, Opcon-A, Visine-A)    Omeprazole (Prilosec)    Oxymetazoline (Afrin)    Triamcinolone (Nasacort)    None of the above   Have you taken any monoamine oxidase inhibitor (MAOI) medications in the last 14 days? Examples include rasagiline (Azilect), selegiline (Eldepryl, Zelapar), isocarboxazid (Marplan), phenelzine (Nardil), and tranylcypromine (Parnate). Select one.    No, not that I know of   Not selected:    Yes   Do you take Kynmobi or Apokyn (apomorphine)? Select one.    No   Not selected:    Yes   Are you still taking these medications listed in your medical record? If you're not taking any of these, click Next. Select all that apply.    potassium citrate 10 MEQ (1080 MG) CR tablet    Ocella 3-0.03 MG per tablet   Are you taking any other medications, vitamins, or supplements? Select one.    Yes   Not selected:    No   Have you ever had an allergic or bad reaction to any medication? Select one.    No   Not selected:    Yes   Are you allergic to milk or to the proteins found in milk (for example, whey or casein)? A milk allergy is different from lactose intolerance. Select one.    No, not that I know of   Not selected:    Yes   Have you ever had jaundice or liver problems as a result of taking amoxicillin-clavulanate (Augmentin)? Jaundice is a condition in which the skin and the whites of the eyes turn yellow. Select all that apply.    No, not that I know of   Not selected:    Yes, jaundice    Yes, liver problems   Have you ever had jaundice or liver problems as a result of taking azithromycin (Zithromax, Zmax)? Jaundice is a condition in which the skin and the whites of the eyes turn yellow. Select all that apply.    No, not  that I know of   Not selected:    Yes, jaundice    Yes, liver problems   Do you need a doctor's note? A doctor's note confirms that you received care today and states when you can return to school or work. It does not contain information about your diagnosis or treatment plan. Your provider will make the final decision on whether to give you a doctor's note and for how long. Doctor's notes CANNOT be backdated. We can't provide medical leave paperwork through this type of visit. If more paperwork is needed to request time off, contact your primary care provider. Select one.    No   Not selected:    Today only (1 day)    Today and tomorrow (2 days)    3 days    5 days    7 days   Is there anything you'd like to add about your symptoms? Please limit your comments to the symptoms asked about in this interview. If you include comments about other concerns, your provider may recommend that you be seen in person.    Cough is forceful and causes chest pain. Can feel popping and cracking in my chest when moving. Hard to catch breath when coughing.   ----------   Medical history   Medical history data does not currently exist for this patient.

## 2023-12-13 ENCOUNTER — OFFICE VISIT (OUTPATIENT)
Dept: FAMILY MEDICINE CLINIC | Facility: CLINIC | Age: 23
End: 2023-12-13
Payer: COMMERCIAL

## 2023-12-13 VITALS
BODY MASS INDEX: 19.84 KG/M2 | HEIGHT: 63 IN | OXYGEN SATURATION: 99 % | TEMPERATURE: 98.4 F | RESPIRATION RATE: 16 BRPM | WEIGHT: 112 LBS | HEART RATE: 96 BPM | SYSTOLIC BLOOD PRESSURE: 104 MMHG | DIASTOLIC BLOOD PRESSURE: 62 MMHG

## 2023-12-13 DIAGNOSIS — J20.9 ACUTE BRONCHITIS, UNSPECIFIED ORGANISM: Primary | ICD-10-CM

## 2023-12-13 DIAGNOSIS — R05.9 COUGH, UNSPECIFIED TYPE: ICD-10-CM

## 2023-12-13 LAB
EXPIRATION DATE: NORMAL
EXPIRATION DATE: NORMAL
FLUAV AG UPPER RESP QL IA.RAPID: NOT DETECTED
FLUBV AG UPPER RESP QL IA.RAPID: NOT DETECTED
INTERNAL CONTROL: NORMAL
INTERNAL CONTROL: NORMAL
Lab: 6639
Lab: NORMAL
S PYO AG THROAT QL: NEGATIVE
SARS-COV-2 AG UPPER RESP QL IA.RAPID: NOT DETECTED

## 2023-12-13 PROCEDURE — 99213 OFFICE O/P EST LOW 20 MIN: CPT

## 2023-12-13 PROCEDURE — 87880 STREP A ASSAY W/OPTIC: CPT

## 2023-12-13 PROCEDURE — 87428 SARSCOV & INF VIR A&B AG IA: CPT

## 2023-12-13 RX ORDER — ALBUTEROL SULFATE 90 UG/1
AEROSOL, METERED RESPIRATORY (INHALATION)
COMMUNITY
Start: 2023-12-11

## 2023-12-13 RX ORDER — METHYLPREDNISOLONE 4 MG/1
TABLET ORAL
Qty: 21 TABLET | Refills: 0 | Status: SHIPPED | OUTPATIENT
Start: 2023-12-13

## 2023-12-13 RX ORDER — BENZONATATE 200 MG/1
CAPSULE ORAL
COMMUNITY
Start: 2023-12-11

## 2023-12-13 NOTE — PROGRESS NOTES
"Chief Complaint   Patient presents with    Cough     With chest congestion. Symptoms started 12/11/2023 and today nasal congestion and itchy ears started       Subjective      Bernie Renae is a 23 y.o. who presents for cough and chest congestion with wheezing.  Cough and wheezing started on Monday and had telehealth that day - was given albuterol inhaler and tessalon perles.    Using albuterol inhaler and it is helping some with wheezing.  Has some throat pain. Productive cough.  Congestion and runny nose.  Ears are itching.       The following portions of the patient's history were reviewed and updated as appropriate: allergies, current medications, past family history, past medical history, past social history, past surgical history, and problem list.    Review of Systems   Constitutional:  Negative for chills and fever.   HENT:  Positive for congestion, postnasal drip, rhinorrhea, sinus pressure and sore throat. Negative for ear pain.    Respiratory:  Positive for cough and wheezing. Negative for shortness of breath.    Cardiovascular:  Negative for chest pain.       Objective   Vital Signs:  /62   Pulse 96   Temp 98.4 °F (36.9 °C) (Infrared)   Resp 16   Ht 160 cm (63\")   Wt 50.8 kg (112 lb)   SpO2 99%   BMI 19.84 kg/m²     BMI is within normal parameters. No other follow-up for BMI required.        Physical Exam  Vitals and nursing note reviewed.   Constitutional:       Appearance: Normal appearance.   HENT:      Right Ear: Tympanic membrane, ear canal and external ear normal.      Left Ear: Tympanic membrane, ear canal and external ear normal.      Nose: Nose normal.      Mouth/Throat:      Pharynx: Posterior oropharyngeal erythema (mild) present. No oropharyngeal exudate.   Cardiovascular:      Rate and Rhythm: Normal rate and regular rhythm.      Heart sounds: Normal heart sounds.   Pulmonary:      Breath sounds: Wheezing present.   Neurological:      General: No focal deficit present.      " Mental Status: She is alert and oriented to person, place, and time.   Psychiatric:         Mood and Affect: Mood normal.         Behavior: Behavior normal.          Result Review                     Assessment and Plan  Diagnoses and all orders for this visit:    1. Acute bronchitis, unspecified organism (Primary)  -     methylPREDNISolone (MEDROL) 4 MG dose pack; Take as directed on package instructions.  Dispense: 21 tablet; Refill: 0    2. Cough, unspecified type  -     POCT rapid strep A  -     POCT SARS-CoV-2 Antigen DEBRA + Flu      Medication as prescribed.  Negative covid/flu/strep in clinic.   Follow up if no improvement or worsening.         Discussed medications prescribed and OTC medications recommended.  Discussed medication safety, possible side effects and how to take or administer medications. Instructed patient to report any adverse reactions, side effects or concerns.     Reviewed physical exam findings and plan with patient who verbalized understanding and agrees with plan of care. Patient was given opportunity to ask questions and all concerns were addressed prior to the conclusion of today's visit.     Follow Up  No follow-ups on file.  Patient was given instructions and counseling regarding her condition or for health maintenance advice. Please see specific information pulled into the AVS if appropriate.     Note to patient: The 21st Century Cures Act makes medical notes like these available to patients in the interest of transparency. However, be advised this is a medical document. It is intended as peer to peer communication. It is written in medical language and may contain abbreviations or verbiage that are unfamiliar. It may appear blunt or direct. Medical documents are intended to carry relevant information, facts as evident, and the clinical opinion of the provider.

## 2024-03-08 ENCOUNTER — OFFICE VISIT (OUTPATIENT)
Dept: FAMILY MEDICINE CLINIC | Facility: CLINIC | Age: 24
End: 2024-03-08
Payer: COMMERCIAL

## 2024-03-08 VITALS
DIASTOLIC BLOOD PRESSURE: 62 MMHG | WEIGHT: 108 LBS | SYSTOLIC BLOOD PRESSURE: 98 MMHG | BODY MASS INDEX: 19.14 KG/M2 | HEART RATE: 68 BPM | RESPIRATION RATE: 18 BRPM | TEMPERATURE: 97.8 F | HEIGHT: 63 IN | OXYGEN SATURATION: 100 %

## 2024-03-08 DIAGNOSIS — G43.909 MIGRAINE WITHOUT STATUS MIGRAINOSUS, NOT INTRACTABLE, UNSPECIFIED MIGRAINE TYPE: Primary | ICD-10-CM

## 2024-03-08 DIAGNOSIS — Z30.41 ORAL CONTRACEPTIVE USE: ICD-10-CM

## 2024-03-08 PROCEDURE — 99214 OFFICE O/P EST MOD 30 MIN: CPT

## 2024-03-08 RX ORDER — RIZATRIPTAN BENZOATE 10 MG/1
10 TABLET, ORALLY DISINTEGRATING ORAL ONCE AS NEEDED
Qty: 20 TABLET | Refills: 1 | Status: SHIPPED | OUTPATIENT
Start: 2024-03-08

## 2024-03-08 RX ORDER — VENLAFAXINE HYDROCHLORIDE 37.5 MG/1
37.5 CAPSULE, EXTENDED RELEASE ORAL DAILY
Qty: 30 CAPSULE | Refills: 0 | Status: SHIPPED | OUTPATIENT
Start: 2024-03-08

## 2024-03-08 NOTE — PROGRESS NOTES
"Chief Complaint   Patient presents with    Migraine     Almost everyday. Getting worse since the last 3 weeks. Behind the left eye  Birth control options?       Subjective      Bernie Renae is a 23 y.o. who presents for migraines daily and to discuss birth control options.  Has had migraines in the past but the frequency just increased in the last month.  Starts around 11:30-12 and has aura of blurry vision, pain is usually behind left eye.  Nausea with the pain and sometimes does vomit.  She used to take excedrin and it would go away, but now she has to stop her activity in order for it to improve.    States she feels like the only time she is irritable for the weeks she is not on the placebo pills. Using for contraception and control of dysmenorrhea     The following portions of the patient's history were reviewed and updated as appropriate: allergies, current medications, past family history, past medical history, past social history, past surgical history, and problem list.    Review of Systems   Constitutional:  Negative for chills and fever.   Respiratory:  Negative for chest tightness and shortness of breath.    Neurological:  Positive for light-headedness and headache. Negative for dizziness, tremors, seizures, syncope, facial asymmetry, speech difficulty, weakness, numbness and confusion.   Psychiatric/Behavioral:  Negative for depressed mood.        Objective   Vital Signs:  BP 98/62   Pulse 68   Temp 97.8 °F (36.6 °C)   Resp 18   Ht 160 cm (63\")   Wt 49 kg (108 lb)   SpO2 100%   BMI 19.13 kg/m²     BMI is within normal parameters. No other follow-up for BMI required.        Physical Exam  Vitals and nursing note reviewed.   Constitutional:       Appearance: Normal appearance.   Eyes:      Extraocular Movements: Extraocular movements intact.      Pupils: Pupils are equal, round, and reactive to light.   Neurological:      Mental Status: She is alert.      Cranial Nerves: Cranial nerves 2-12 are " intact.          Result Review                     Assessment and Plan  Diagnoses and all orders for this visit:    1. Migraine without status migrainosus, not intractable, unspecified migraine type (Primary)  -     venlafaxine XR (Effexor XR) 37.5 MG 24 hr capsule; Take 1 capsule by mouth Daily.  Dispense: 30 capsule; Refill: 0  -     rizatriptan MLT (Maxalt-MLT) 10 MG disintegrating tablet; Place 1 tablet on the tongue 1 (One) Time As Needed for Migraine. May repeat in 2 hours if needed  Dispense: 20 tablet; Refill: 1    2. Oral contraceptive use      Start effexor as prescribed for migraine prevention. Maxalt for abortive medication. Discussed risks, benefits and side effects of both medications with patient and she is agreeable with proceeding with plan  Plan to discuss OCP at follow up to see if effexor helps with irritability first. Consider ortho tri cyclen Lo (lower estrogen dose) at follow up if irritability has not improved.   Follow up in 3 weeks for recheck, consider increasing effexor at that time if tolerating and HA's have not resolved or improved significantly.             Discussed medications prescribed and OTC medications recommended.  Discussed medication safety, possible side effects and how to take or administer medications. Instructed patient to report any adverse reactions, side effects or concerns.     Reviewed physical exam findings and plan with patient who verbalized understanding and agrees with plan of care. Patient was given opportunity to ask questions and all concerns were addressed prior to the conclusion of today's visit.     Follow Up  Return in about 3 weeks (around 3/29/2024) for Recheck.  Patient was given instructions and counseling regarding her condition or for health maintenance advice. Please see specific information pulled into the AVS if appropriate.     Note to patient: The 21st Century Cures Act makes medical notes like these available to patients in the interest of  transparency. However, be advised this is a medical document. It is intended as peer to peer communication. It is written in medical language and may contain abbreviations or verbiage that are unfamiliar. It may appear blunt or direct. Medical documents are intended to carry relevant information, facts as evident, and the clinical opinion of the provider.

## 2024-03-28 ENCOUNTER — OFFICE VISIT (OUTPATIENT)
Dept: FAMILY MEDICINE CLINIC | Facility: CLINIC | Age: 24
End: 2024-03-28
Payer: COMMERCIAL

## 2024-03-28 VITALS
TEMPERATURE: 98 F | WEIGHT: 112 LBS | DIASTOLIC BLOOD PRESSURE: 58 MMHG | OXYGEN SATURATION: 99 % | BODY MASS INDEX: 19.84 KG/M2 | HEART RATE: 61 BPM | HEIGHT: 63 IN | SYSTOLIC BLOOD PRESSURE: 112 MMHG

## 2024-03-28 DIAGNOSIS — G43.909 MIGRAINE WITHOUT STATUS MIGRAINOSUS, NOT INTRACTABLE, UNSPECIFIED MIGRAINE TYPE: ICD-10-CM

## 2024-03-28 PROCEDURE — 99213 OFFICE O/P EST LOW 20 MIN: CPT

## 2024-03-28 RX ORDER — VENLAFAXINE HYDROCHLORIDE 37.5 MG/1
37.5 CAPSULE, EXTENDED RELEASE ORAL DAILY
Qty: 90 CAPSULE | Refills: 1 | Status: SHIPPED | OUTPATIENT
Start: 2024-03-28

## 2024-03-28 NOTE — PROGRESS NOTES
"Chief Complaint   Patient presents with    3 week f/u on migraines     Much better. Only 1 migraine since being on the medication        Subjective      Bernie Renae is a 23 y.o. who presents for follow up on migraine headaches.  Started effexor and it seems to be helping with frequency of migraines.  She has only had one headache since starting the medication. Does have a rash on her arms and legs that started about a week ago. Has had some nasal congestion as well.  No swelling or irritation of her throat.  States it only itches when she gets out of the shower.  Denies any change in detergents or soaps.     The following portions of the patient's history were reviewed and updated as appropriate: allergies, current medications, past family history, past medical history, past social history, past surgical history, and problem list.    Review of Systems   Constitutional:  Negative for chills and fever.   Respiratory:  Negative for chest tightness and shortness of breath.    Cardiovascular:  Negative for chest pain.   Skin:  Positive for rash.   Neurological:  Negative for seizures, syncope, weakness, numbness and headache.   Psychiatric/Behavioral:  Positive for stress. Negative for suicidal ideas and depressed mood. The patient is not nervous/anxious.        Objective   Vital Signs:  /58   Pulse 61   Temp 98 °F (36.7 °C)   Ht 160 cm (63\")   Wt 50.8 kg (112 lb)   SpO2 99%   BMI 19.84 kg/m²     BMI is within normal parameters. No other follow-up for BMI required.        Physical Exam  Vitals and nursing note reviewed.   Constitutional:       Appearance: Normal appearance.   Eyes:      Extraocular Movements: Extraocular movements intact.   Cardiovascular:      Rate and Rhythm: Normal rate and regular rhythm.      Heart sounds: Normal heart sounds.   Neurological:      Mental Status: She is alert.        Result Review                     Assessment and Plan  Diagnoses and all orders for this visit:    1. " Migraine without status migrainosus, not intractable, unspecified migraine type  -     venlafaxine XR (Effexor XR) 37.5 MG 24 hr capsule; Take 1 capsule by mouth Daily.  Dispense: 90 capsule; Refill: 1        Continue medication as prescribed  Call back if rash does not resolve with OTC allergy medication and increased moisturizing lotion.  Given her nasal congestion, could be related to seasonal allergies.   If symptoms of allergic reaction including shortness of breath or tightening of throat / worsening rash, patient to follow up for mild symptoms or go to ER for severe symptoms.            Discussed medications prescribed and OTC medications recommended.  Discussed medication safety, possible side effects and how to take or administer medications. Instructed patient to report any adverse reactions, side effects or concerns.     Reviewed physical exam findings and plan with patient who verbalized understanding and agrees with plan of care. Patient was given opportunity to ask questions and all concerns were addressed prior to the conclusion of today's visit.     Follow Up  Return in about 6 months (around 9/28/2024) for Recheck migraines.  Patient was given instructions and counseling regarding her condition or for health maintenance advice. Please see specific information pulled into the AVS if appropriate.     Note to patient: The 21st Century Cures Act makes medical notes like these available to patients in the interest of transparency. However, be advised this is a medical document. It is intended as peer to peer communication. It is written in medical language and may contain abbreviations or verbiage that are unfamiliar. It may appear blunt or direct. Medical documents are intended to carry relevant information, facts as evident, and the clinical opinion of the provider.

## 2024-04-30 ENCOUNTER — OFFICE VISIT (OUTPATIENT)
Dept: FAMILY MEDICINE CLINIC | Facility: CLINIC | Age: 24
End: 2024-04-30
Payer: COMMERCIAL

## 2024-04-30 VITALS
TEMPERATURE: 98 F | DIASTOLIC BLOOD PRESSURE: 68 MMHG | BODY MASS INDEX: 19.31 KG/M2 | SYSTOLIC BLOOD PRESSURE: 102 MMHG | HEART RATE: 74 BPM | OXYGEN SATURATION: 100 % | WEIGHT: 109 LBS | HEIGHT: 63 IN

## 2024-04-30 DIAGNOSIS — G43.909 MIGRAINE WITHOUT STATUS MIGRAINOSUS, NOT INTRACTABLE, UNSPECIFIED MIGRAINE TYPE: ICD-10-CM

## 2024-04-30 PROCEDURE — 99213 OFFICE O/P EST LOW 20 MIN: CPT

## 2024-04-30 NOTE — PROGRESS NOTES
"Chief Complaint   Patient presents with    Migraine     X since last Wednesday, Medication not heplping       Subjective      Bernie Renae is a 23 y.o. who presents for migraine that has been ongoing for the past week or so.  First day she took her rizatriptan and it relieved headache but then it returned the next day.  This feels like her normal migraines.  No changes in her symptoms other than duration.  She has taken her effexor this AM but no other medications.      The following portions of the patient's history were reviewed and updated as appropriate: allergies, current medications, past family history, past medical history, past social history, past surgical history, and problem list.    Review of Systems   Constitutional:  Negative for chills and fever.   Eyes:  Positive for photophobia. Negative for blurred vision.   Respiratory:  Negative for shortness of breath.    Cardiovascular:  Negative for chest pain.   Neurological:  Positive for headache. Negative for dizziness and numbness.       Objective   Vital Signs:  /68   Pulse 74   Temp 98 °F (36.7 °C)   Ht 160 cm (63\")   Wt 49.4 kg (109 lb)   SpO2 100%   BMI 19.31 kg/m²     BMI is within normal parameters. No other follow-up for BMI required.        Physical Exam  Vitals and nursing note reviewed.   Constitutional:       Appearance: Normal appearance.   Eyes:      Extraocular Movements: Extraocular movements intact.      Pupils: Pupils are equal, round, and reactive to light.   Cardiovascular:      Rate and Rhythm: Normal rate and regular rhythm.      Heart sounds: Normal heart sounds.   Pulmonary:      Breath sounds: Normal breath sounds.   Neurological:      Mental Status: She is alert.      Cranial Nerves: Cranial nerves 2-12 are intact.      Coordination: Coordination is intact. Romberg sign negative.   Psychiatric:         Mood and Affect: Mood normal.         Behavior: Behavior normal.          Result Review                   "   Assessment and Plan  Diagnoses and all orders for this visit:    1. Migraine without status migrainosus, not intractable, unspecified migraine type      Discussed option for toradol, but patient does not want injection today.  Patient has to work today and cannot take her maxalt though it relieved her symptoms last week.  Recommended she take one dose of Ubrelvy (sample provided as packaged per  x 1 dose) today. She can take her rizatriptan tomorrow if no relief or return to clinic to discuss toradol injection.   Follow up if no improvement or worsening, to ER with severe symptoms.         Discussed medications prescribed and OTC medications recommended.  Discussed medication safety, possible side effects and how to take or administer medications. Instructed patient to report any adverse reactions, side effects or concerns.     Reviewed physical exam findings and plan with patient who verbalized understanding and agrees with plan of care. Patient was given opportunity to ask questions and all concerns were addressed prior to the conclusion of today's visit.     Follow Up  Return in about 3 weeks (around 5/21/2024) for Recheck migraines .  Patient was given instructions and counseling regarding her condition or for health maintenance advice. Please see specific information pulled into the AVS if appropriate.     Note to patient: The 21st Century Cures Act makes medical notes like these available to patients in the interest of transparency. However, be advised this is a medical document. It is intended as peer to peer communication. It is written in medical language and may contain abbreviations or verbiage that are unfamiliar. It may appear blunt or direct. Medical documents are intended to carry relevant information, facts as evident, and the clinical opinion of the provider.

## 2024-05-01 ENCOUNTER — OFFICE VISIT (OUTPATIENT)
Dept: FAMILY MEDICINE CLINIC | Facility: CLINIC | Age: 24
End: 2024-05-01
Payer: COMMERCIAL

## 2024-05-01 VITALS
TEMPERATURE: 97.5 F | HEART RATE: 52 BPM | DIASTOLIC BLOOD PRESSURE: 70 MMHG | OXYGEN SATURATION: 99 % | WEIGHT: 108.2 LBS | SYSTOLIC BLOOD PRESSURE: 120 MMHG | BODY MASS INDEX: 19.17 KG/M2 | HEIGHT: 63 IN | RESPIRATION RATE: 14 BRPM

## 2024-05-01 DIAGNOSIS — G43.909 MIGRAINE WITHOUT STATUS MIGRAINOSUS, NOT INTRACTABLE, UNSPECIFIED MIGRAINE TYPE: Primary | ICD-10-CM

## 2024-05-01 PROCEDURE — 96372 THER/PROPH/DIAG INJ SC/IM: CPT | Performed by: NURSE PRACTITIONER

## 2024-05-01 PROCEDURE — 99213 OFFICE O/P EST LOW 20 MIN: CPT | Performed by: NURSE PRACTITIONER

## 2024-05-01 RX ORDER — RIMEGEPANT SULFATE 75 MG/75MG
75 TABLET, ORALLY DISINTEGRATING ORAL DAILY
Qty: 4 TABLET | Refills: 0 | COMMUNITY
Start: 2024-05-01

## 2024-05-01 RX ORDER — KETOROLAC TROMETHAMINE 30 MG/ML
30 INJECTION, SOLUTION INTRAMUSCULAR; INTRAVENOUS ONCE
Status: COMPLETED | OUTPATIENT
Start: 2024-05-01 | End: 2024-05-01

## 2024-05-01 RX ADMIN — KETOROLAC TROMETHAMINE 30 MG: 30 INJECTION, SOLUTION INTRAMUSCULAR; INTRAVENOUS at 09:08

## 2024-05-01 NOTE — PROGRESS NOTES
Date: 2024   Patient Name: Bernie Renae  : 2000   MRN: [id number]     Chief Complaint:    Chief Complaint   Patient presents with    Follow-up     Continued migraine       History of Present Illness: Bernie Renae is a 23 y.o. female who is here today for Wednesday she started with headache and every day since having headache, rates pain 3/10, floaters. Rizatriptan makes her too sleepy. Took ubrelvy yesterday without relief of symptoms. Menses off 2 days. No nausea, elevated BP.  She is a Teacher, teaching juniors. No other associated symptoms.                Review of Systems:   Review of Systems   Neurological:  Positive for headache.       Past Medical History:   Past Medical History:   Diagnosis Date    Anemia     Kidney stones        Past Surgical History:   Past Surgical History:   Procedure Laterality Date    APPENDECTOMY         Family History: History reviewed. No pertinent family history.    Social History:   Social History     Socioeconomic History    Marital status:    Tobacco Use    Smoking status: Never    Smokeless tobacco: Never   Vaping Use    Vaping status: Never Used       Medications:     Current Outpatient Medications:     Ocella 3-0.03 MG per tablet, Take 1 tablet by mouth Daily., Disp: 28 tablet, Rfl: 12    potassium citrate (UROCIT-K) 10 MEQ (1080 MG) CR tablet, Take 1 tablet by mouth Daily., Disp: , Rfl:     rizatriptan MLT (Maxalt-MLT) 10 MG disintegrating tablet, Place 1 tablet on the tongue 1 (One) Time As Needed for Migraine. May repeat in 2 hours if needed, Disp: 20 tablet, Rfl: 1    venlafaxine XR (Effexor XR) 37.5 MG 24 hr capsule, Take 1 capsule by mouth Daily., Disp: 90 capsule, Rfl: 1  No current facility-administered medications for this visit.    Allergies:   No Known Allergies      Physical Exam:  Vital Signs:   Vitals:    24 0838   BP: 120/70   Pulse: 52   Resp: 14   Temp: 97.5 °F (36.4 °C)   SpO2: 99%   Weight: 49.1 kg (108 lb 3.2 oz)  "  Height: 160 cm (63\")     Body mass index is 19.17 kg/m².     Physical Exam  Vitals and nursing note reviewed.   Constitutional:       Appearance: Normal appearance.   HENT:      Head: Normocephalic and atraumatic.   Eyes:      Extraocular Movements: Extraocular movements intact.      Pupils: Pupils are equal, round, and reactive to light.   Cardiovascular:      Rate and Rhythm: Normal rate and regular rhythm.   Pulmonary:      Effort: Pulmonary effort is normal.      Breath sounds: Normal breath sounds.   Abdominal:      General: Bowel sounds are normal.   Musculoskeletal:      Cervical back: Normal.      Thoracic back: Normal.      Lumbar back: No tenderness. Normal range of motion.   Skin:     General: Skin is warm.   Neurological:      General: No focal deficit present.      Mental Status: She is alert and oriented to person, place, and time.   Psychiatric:         Mood and Affect: Mood normal.           Assessment/Plan:   Diagnoses and all orders for this visit:    1. Migraine without status migrainosus, not intractable, unspecified migraine type (Primary)  -     ketorolac (TORADOL) injection 30 mg       Toradol injection in clinic today patient tolerated well  Nutrec samples given in clinic today, instructed to take today and 2 days following  Monitor for worsening symptoms   Go to the ER with severe symptoms     Follow Up:   Return if symptoms worsen or fail to improve.    Lashell Ward. MIRZA   [facility]   "

## 2024-08-08 ENCOUNTER — OFFICE VISIT (OUTPATIENT)
Dept: FAMILY MEDICINE CLINIC | Facility: CLINIC | Age: 24
End: 2024-08-08
Payer: COMMERCIAL

## 2024-08-08 VITALS
HEIGHT: 63 IN | BODY MASS INDEX: 20.55 KG/M2 | RESPIRATION RATE: 16 BRPM | DIASTOLIC BLOOD PRESSURE: 68 MMHG | WEIGHT: 116 LBS | TEMPERATURE: 97.3 F | HEART RATE: 67 BPM | OXYGEN SATURATION: 99 % | SYSTOLIC BLOOD PRESSURE: 116 MMHG

## 2024-08-08 DIAGNOSIS — Z30.011 ENCOUNTER FOR PRESCRIPTION OF ORAL CONTRACEPTIVES: ICD-10-CM

## 2024-08-08 DIAGNOSIS — G43.909 ACUTE MIGRAINE: ICD-10-CM

## 2024-08-08 DIAGNOSIS — G43.109 MIGRAINE WITH AURA AND WITHOUT STATUS MIGRAINOSUS, NOT INTRACTABLE: Primary | ICD-10-CM

## 2024-08-08 PROCEDURE — 99214 OFFICE O/P EST MOD 30 MIN: CPT | Performed by: PHYSICIAN ASSISTANT

## 2024-08-08 RX ORDER — AMITRIPTYLINE HYDROCHLORIDE 25 MG/1
25 TABLET, FILM COATED ORAL NIGHTLY
Qty: 30 TABLET | Refills: 1 | Status: SHIPPED | OUTPATIENT
Start: 2024-08-08

## 2024-08-08 RX ORDER — DROSPIRENONE AND ETHINYL ESTRADIOL 0.03MG-3MG
1 KIT ORAL DAILY
Qty: 28 TABLET | Refills: 12 | Status: SHIPPED | OUTPATIENT
Start: 2024-08-08

## 2024-08-08 NOTE — PROGRESS NOTES
Subjective   Bernie Renae is a 23 y.o. female.     History of Present Illness   History of Present Illness  The patient presents for evaluation of migraines.    She began experiencing migraines last year, which occasionally intensify during her menstrual cycle. Initially, she did not notice them when they first appeared. She has regular vision exams and uses glasses for nighttime driving. She believes her blurred vision, a side effect of her mood medication, Effexor, has exacerbated her migraines. Prior to this, she did not experience blurred vision. She frequently uses a computer. Her headaches typically begin behind her left eye and last for at least 24 hours. She describes the pain as throbbing and occasionally accompanied by nausea and vomiting. During the school year, she experienced one headache almost daily, but over the summer, it occurred once daily. She has tried various abortive medications. She denies any stress or anxiety. She has tried Ubrelvy, which did not provide relief, and Nurtec, similar to rizatriptan, but her insurance did not cover it. Maxalt or Imitrex cause her to feel slightly tired, but she denies any heart palpitations or flushing. Effexor, prescribed byMIRZA Frank initially provided relief, but after a migraine episode for a week, she switched to two other medications. If she misses a day of Effexor, she feels unwell. She experiences aura and light sensitivity at the onset of a headache. Nurtec took effect about an hour. She declined anti-nausea medication. She has been taking birth control pills for a while, which have not worsened her symptoms. She denies any possibility of pregnancy. Her last menstrual cycle was 2 weeks ago. She is not currently under the care of a gynecologist. Her last Pap smear was 2 years ago, and she denies any increased risk factors.    FAMILY HISTORY  She has a family history of migraines in her mother and aunt.       The following portions of the  "patient's history were reviewed and updated as appropriate: allergies, current medications, past family history, past medical history, past social history, past surgical history, and problem list.    Review of Systems  As noted per HPI     Objective   Blood pressure 116/68, pulse 67, temperature 97.3 °F (36.3 °C), resp. rate 16, height 160 cm (63\"), weight 52.6 kg (116 lb), SpO2 99%.   Physical Exam  Vitals reviewed.   Constitutional:       Appearance: Normal appearance.   Cardiovascular:      Rate and Rhythm: Normal rate and regular rhythm.   Pulmonary:      Effort: Pulmonary effort is normal.      Breath sounds: Normal breath sounds.   Neurological:      Mental Status: She is alert and oriented to person, place, and time.   Psychiatric:         Mood and Affect: Mood normal.         Behavior: Behavior normal.         Thought Content: Thought content normal.         Judgment: Judgment normal.         Results      Assessment & Plan   Assessment & Plan  1. Migraines.  She was advised to schedule an eye exam in the coming weeks to ensure no changes in her vision and to consistently wear her glasses. A low dose of amitriptyline was added to her regimen, to be taken at bedtime. She was advised to aim for 7 to 8 hours of sleep at night. Maxalt was discontinued, but she should continue its use as needed. She was instructed to take Effexor every other day for 2 weeks, after which it will be discontinued.     Diagnoses and all orders for this visit:    1. Migraine with aura and without status migrainosus, not intractable (Primary)  -     Rimegepant Sulfate (NURTEC) 75 MG tablet dispersible tablet; Take 1 tablet by mouth Daily As Needed (migraine headache).  Dispense: 16 tablet; Refill: 4    2. Acute migraine  -     amitriptyline (ELAVIL) 25 MG tablet; Take 1 tablet by mouth Every Night.  Dispense: 30 tablet; Refill: 1    3. Encounter for prescription of oral contraceptives  -     Ocella 3-0.03 MG per tablet; Take 1 tablet by " mouth Daily.  Dispense: 28 tablet; Refill: 12               Patient or patient representative verbalized consent for the use of Ambient Listening during the visit with  LORRAINE Coello for chart documentation. 8/23/2024  08:08 EDT

## 2024-08-12 ENCOUNTER — PATIENT MESSAGE (OUTPATIENT)
Dept: FAMILY MEDICINE CLINIC | Facility: CLINIC | Age: 24
End: 2024-08-12
Payer: COMMERCIAL

## 2024-08-14 NOTE — TELEPHONE ENCOUNTER
From: Bernie Renae  To: Marisel Castillo  Sent: 8/12/2024 7:22 PM EDT  Subject: Migraine Medication    Marisel Anderson. I finally got my new migraine medication. Should I start it now or wait until after I have weaned off the Effexor?

## 2024-08-20 ENCOUNTER — TELEPHONE (OUTPATIENT)
Dept: FAMILY MEDICINE CLINIC | Facility: CLINIC | Age: 24
End: 2024-08-20
Payer: COMMERCIAL

## 2024-08-20 NOTE — TELEPHONE ENCOUNTER
PA for Nurtec  Key: [id number]  LVM to pt to call back Pa was denied, Has she tried any other medications and failed? Appeal?  PT stated Pa went through at just $20

## 2024-09-30 ENCOUNTER — OFFICE VISIT (OUTPATIENT)
Dept: FAMILY MEDICINE CLINIC | Facility: CLINIC | Age: 24
End: 2024-09-30
Payer: COMMERCIAL

## 2024-09-30 VITALS
DIASTOLIC BLOOD PRESSURE: 60 MMHG | BODY MASS INDEX: 19.84 KG/M2 | WEIGHT: 112 LBS | HEIGHT: 63 IN | SYSTOLIC BLOOD PRESSURE: 100 MMHG | TEMPERATURE: 98 F | RESPIRATION RATE: 12 BRPM

## 2024-09-30 DIAGNOSIS — Z13.29 SCREENING FOR ENDOCRINE DISORDER: ICD-10-CM

## 2024-09-30 DIAGNOSIS — E87.6 HYPOKALEMIA: Primary | ICD-10-CM

## 2024-09-30 DIAGNOSIS — Z13.6 SCREENING FOR CARDIOVASCULAR CONDITION: ICD-10-CM

## 2024-09-30 DIAGNOSIS — Z00.00 ANNUAL PHYSICAL EXAM: Primary | ICD-10-CM

## 2024-09-30 PROCEDURE — 99395 PREV VISIT EST AGE 18-39: CPT | Performed by: NURSE PRACTITIONER

## 2024-09-30 NOTE — PROGRESS NOTES
Patient Name: Bernie Renae  : 2000   MRN: [id number]     Chief Complaint:    Chief Complaint   Patient presents with    Annual Exam     Pt is fasting. No pap.        History of Present Illness: Bernie Renae is a 23 y.o. female who is here today for their annual health maintenance and physical. Overall health is good  Thyroid issues in family   No longer taking effexor   She is taking nurtec, about once a week  GYN around 20 years old, due for pap today     No contacts or glasses, eye dr tomorrow  Dentist it have been a while   No tobacco, vaping, alcohol, or illicit drugs  Well balanced diet         Review of Systems:   Review of Systems   Constitutional:  Negative for fatigue.   Neurological:  Negative for headache.       Past Medical History, Social History, Family History and Care Team were all reviewed with patient and updated as appropriate.     Medications:     Current Outpatient Medications:     Ocella 3-0.03 MG per tablet, Take 1 tablet by mouth Daily., Disp: 28 tablet, Rfl: 12    potassium citrate (UROCIT-K) 10 MEQ (1080 MG) CR tablet, Take 1 tablet by mouth Daily., Disp: , Rfl:     Rimegepant Sulfate (NURTEC) 75 MG tablet dispersible tablet, Take 1 tablet by mouth Daily As Needed (migraine headache)., Disp: 16 tablet, Rfl: 4    Allergies:   No Known Allergies      Depression: PHQ-2 Depression Screening  PHQ-2 Total Score:     PHQ-9 Total Score:       Intimate partner violence: (Screen on initial visit, pregnant women, women with injuries, older adult with injury or evidence of neglect):  Violence can be a problem in many people's lives, so I now ask every patient about trauma or abuse they may have experienced in a relationship.  Stress/Safety - Do you feel safe in your relationship?  Afraid/Abused - Have you ever been in a relationship where you were threatened, hurt, or afraid?  Friend/Family - Are your friends aware you have been hurt?  Emergency Plan - Do you have a safe  "place to go and the resources you need in an emergency?    Osteoporosis:   Ost menopausal women < 65 with RF (advancing age, previous fracture, glucocorticoid therapy, parental hip fracture, low body weight, current cigarette smoking, excessive alcohol consumption, rheumatoid arthritis, secondary osteoporosis [hypogonadism/premature menopause, malabsorption, chronic liver disease, IBD]).  All women 65 or older      Physical Exam:  Vital Signs:   Vitals:    09/30/24 0817   BP: 100/60   Resp: 12   Temp: 98 °F (36.7 °C)   Weight: 50.8 kg (112 lb)   Height: 160 cm (63\")     Body mass index is 19.84 kg/m².     Physical Exam  Vitals and nursing note reviewed.   Constitutional:       General: She is awake.      Appearance: Normal appearance. She is well-developed.   HENT:      Head: Normocephalic and atraumatic.      Right Ear: Tympanic membrane, ear canal and external ear normal.      Left Ear: Tympanic membrane, ear canal and external ear normal.      Nose: Nose normal.      Mouth/Throat:      Mouth: Mucous membranes are moist.      Pharynx: Oropharynx is clear.   Eyes:      Extraocular Movements: Extraocular movements intact.      Conjunctiva/sclera: Conjunctivae normal.      Pupils: Pupils are equal, round, and reactive to light.   Neck:      Thyroid: No thyromegaly.   Cardiovascular:      Rate and Rhythm: Normal rate and regular rhythm.      Pulses: Normal pulses.      Heart sounds: Normal heart sounds.   Pulmonary:      Effort: Pulmonary effort is normal.      Breath sounds: Normal breath sounds.   Abdominal:      General: Bowel sounds are normal.      Palpations: Abdomen is soft.   Musculoskeletal:         General: Normal range of motion.      Cervical back: Normal range of motion and neck supple.      Right lower leg: No edema.      Left lower leg: No edema.   Lymphadenopathy:      Cervical: No cervical adenopathy.   Skin:     General: Skin is warm.      Capillary Refill: Capillary refill takes less than 2 seconds. "   Neurological:      General: No focal deficit present.      Mental Status: She is alert and oriented to person, place, and time.   Psychiatric:         Mood and Affect: Mood normal.         Behavior: Behavior normal. Behavior is cooperative.         Thought Content: Thought content normal.         Judgment: Judgment normal.         Procedures      Assessment/Plan:   Diagnoses and all orders for this visit:    1. Annual physical exam (Primary)  -     Comprehensive Metabolic Panel  -     CBC Auto Differential  -     Lipid Panel  -     T4, Free  -     TSH  -     Vitamin D,25-Hydroxy  -     Vitamin B12 & Folate    2. Screening for endocrine disorder  -     T4, Free  -     TSH    3. Screening for cardiovascular condition  -     Lipid Panel       Health maintenance:  Continue routine health maintenance including routine dentistry, eye exam, safety seatbelt use, exercise, and proper nutrition.   Exercise 3-4 times a week, 30-45 mins a day  Increase water intake  Monitor for acute illnesses      Follow Up:   Return for Annual physical.    Healthcare Maintenance:   Counseling provided on preventative care and vaccines .   Bernie Renae voices understanding and acceptance of this advice and will call back with any further questions or concerns. AVS with preventive healthcare tips printed for patient.     Lashell Ward. MIRZA UG[facility]

## 2024-10-01 LAB
25(OH)D3+25(OH)D2 SERPL-MCNC: 36 NG/ML (ref 30–100)
ALBUMIN SERPL-MCNC: 4.1 G/DL (ref 3.5–5.2)
ALBUMIN/GLOB SERPL: 1.6 G/DL
ALP SERPL-CCNC: 66 U/L (ref 39–117)
ALT SERPL-CCNC: 13 U/L (ref 1–33)
AST SERPL-CCNC: 16 U/L (ref 1–32)
BASOPHILS # BLD AUTO: 0.05 10*3/MM3 (ref 0–0.2)
BASOPHILS NFR BLD AUTO: 0.7 % (ref 0–1.5)
BILIRUB SERPL-MCNC: 0.3 MG/DL (ref 0–1.2)
BUN SERPL-MCNC: 14 MG/DL (ref 6–20)
BUN/CREAT SERPL: 18.9 (ref 7–25)
CALCIUM SERPL-MCNC: 9.4 MG/DL (ref 8.6–10.5)
CHLORIDE SERPL-SCNC: 102 MMOL/L (ref 98–107)
CHOLEST SERPL-MCNC: 199 MG/DL (ref 0–200)
CO2 SERPL-SCNC: 25.4 MMOL/L (ref 22–29)
CREAT SERPL-MCNC: 0.74 MG/DL (ref 0.57–1)
EGFRCR SERPLBLD CKD-EPI 2021: 116 ML/MIN/1.73
EOSINOPHIL # BLD AUTO: 0.09 10*3/MM3 (ref 0–0.4)
EOSINOPHIL NFR BLD AUTO: 1.2 % (ref 0.3–6.2)
ERYTHROCYTE [DISTWIDTH] IN BLOOD BY AUTOMATED COUNT: 11.2 % (ref 12.3–15.4)
FOLATE SERPL-MCNC: >20 NG/ML (ref 4.78–24.2)
GLOBULIN SER CALC-MCNC: 2.5 GM/DL
GLUCOSE SERPL-MCNC: 76 MG/DL (ref 65–99)
HCT VFR BLD AUTO: 40.5 % (ref 34–46.6)
HDLC SERPL-MCNC: 79 MG/DL (ref 40–60)
HGB BLD-MCNC: 13.5 G/DL (ref 12–15.9)
IMM GRANULOCYTES # BLD AUTO: 0.03 10*3/MM3 (ref 0–0.05)
IMM GRANULOCYTES NFR BLD AUTO: 0.4 % (ref 0–0.5)
LDLC SERPL CALC-MCNC: 98 MG/DL (ref 0–100)
LYMPHOCYTES # BLD AUTO: 2.65 10*3/MM3 (ref 0.7–3.1)
LYMPHOCYTES NFR BLD AUTO: 35.6 % (ref 19.6–45.3)
MCH RBC QN AUTO: 31.3 PG (ref 26.6–33)
MCHC RBC AUTO-ENTMCNC: 33.3 G/DL (ref 31.5–35.7)
MCV RBC AUTO: 93.8 FL (ref 79–97)
MONOCYTES # BLD AUTO: 0.46 10*3/MM3 (ref 0.1–0.9)
MONOCYTES NFR BLD AUTO: 6.2 % (ref 5–12)
NEUTROPHILS # BLD AUTO: 4.17 10*3/MM3 (ref 1.7–7)
NEUTROPHILS NFR BLD AUTO: 55.9 % (ref 42.7–76)
NRBC BLD AUTO-RTO: 0 /100 WBC (ref 0–0.2)
PLATELET # BLD AUTO: 363 10*3/MM3 (ref 140–450)
POTASSIUM SERPL-SCNC: 4.3 MMOL/L (ref 3.5–5.2)
PROT SERPL-MCNC: 6.6 G/DL (ref 6–8.5)
RBC # BLD AUTO: 4.32 10*6/MM3 (ref 3.77–5.28)
SODIUM SERPL-SCNC: 138 MMOL/L (ref 136–145)
T4 FREE SERPL-MCNC: 1.43 NG/DL (ref 0.92–1.68)
TRIGL SERPL-MCNC: 129 MG/DL (ref 0–150)
TSH SERPL DL<=0.005 MIU/L-ACNC: 2.77 UIU/ML (ref 0.27–4.2)
VIT B12 SERPL-MCNC: 609 PG/ML (ref 211–946)
VLDLC SERPL CALC-MCNC: 22 MG/DL (ref 5–40)
WBC # BLD AUTO: 7.45 10*3/MM3 (ref 3.4–10.8)

## 2024-10-01 RX ORDER — POTASSIUM CITRATE 10 MEQ/1
10 TABLET, EXTENDED RELEASE ORAL DAILY
Qty: 90 TABLET | Refills: 1 | Status: SHIPPED | OUTPATIENT
Start: 2024-10-01

## 2024-10-22 DIAGNOSIS — G43.109 MIGRAINE WITH AURA AND WITHOUT STATUS MIGRAINOSUS, NOT INTRACTABLE: Primary | ICD-10-CM

## 2024-11-18 ENCOUNTER — OFFICE VISIT (OUTPATIENT)
Dept: FAMILY MEDICINE CLINIC | Facility: CLINIC | Age: 24
End: 2024-11-18
Payer: COMMERCIAL

## 2024-11-18 VITALS
TEMPERATURE: 97.1 F | WEIGHT: 111.2 LBS | DIASTOLIC BLOOD PRESSURE: 60 MMHG | SYSTOLIC BLOOD PRESSURE: 120 MMHG | HEART RATE: 68 BPM | HEIGHT: 63 IN | BODY MASS INDEX: 19.7 KG/M2 | RESPIRATION RATE: 20 BRPM

## 2024-11-18 DIAGNOSIS — J06.9 ACUTE URI: Primary | ICD-10-CM

## 2024-11-18 DIAGNOSIS — J02.9 SORE THROAT: ICD-10-CM

## 2024-11-18 LAB
EXPIRATION DATE: NORMAL
INTERNAL CONTROL: NORMAL
Lab: NORMAL
S PYO AG THROAT QL: NEGATIVE

## 2024-11-18 PROCEDURE — 99213 OFFICE O/P EST LOW 20 MIN: CPT | Performed by: FAMILY MEDICINE

## 2024-11-18 PROCEDURE — 87880 STREP A ASSAY W/OPTIC: CPT | Performed by: FAMILY MEDICINE

## 2024-11-18 NOTE — PROGRESS NOTES
"Chief Complaint   Patient presents with    Sore Throat     Since Saturday     Cough       Subjective      Bernie Renae is a 24 y.o. who presents for 2 days of sore throat with some mild right ear discomfort.  No fevers or chills.  Basal congestion and cough are present  Objective   Vital Signs:  /60   Pulse 68   Temp 97.1 °F (36.2 °C)   Resp 20   Ht 160 cm (63\")   Wt 50.4 kg (111 lb 3.2 oz)   BMI 19.70 kg/m²     Physical Exam  Constitutional:       Appearance: Normal appearance.   HENT:      Head: Normocephalic and atraumatic.      Right Ear: Tympanic membrane and ear canal normal.      Left Ear: Tympanic membrane and ear canal normal.      Nose: Congestion present.      Mouth/Throat:      Mouth: Mucous membranes are moist.      Pharynx: Oropharynx is clear.   Eyes:      Conjunctiva/sclera: Conjunctivae normal.   Cardiovascular:      Rate and Rhythm: Normal rate and regular rhythm.      Heart sounds: Normal heart sounds. No murmur heard.  Pulmonary:      Effort: Pulmonary effort is normal. No respiratory distress.      Breath sounds: Normal breath sounds.   Musculoskeletal:      Cervical back: Normal range of motion and neck supple. No tenderness.   Lymphadenopathy:      Cervical: Cervical adenopathy present.   Skin:     General: Skin is warm and dry.   Neurological:      Mental Status: She is alert.   Psychiatric:         Mood and Affect: Mood normal.          Result Review   The following data was reviewed by: Geovani Jacobs MD on 11/18/2024:  Strep          11/18/2024    16:59   Common Labs   POC Strep A, Molecular Negative                    Assessment and Plan  Diagnoses and all orders for this visit:    1. Acute URI (Primary)    2. Sore throat  -     POCT rapid strep A    Patient has a viral respiratory infection.  Discussed symptomatic treatment.  Patient may return to work tomorrow.        Follow Up  No follow-ups on file.  Patient was given instructions and counseling regarding her " condition or for health maintenance advice. Please see specific information pulled into the AVS if appropriate.

## 2025-01-10 ENCOUNTER — TELEMEDICINE (OUTPATIENT)
Dept: FAMILY MEDICINE CLINIC | Facility: TELEHEALTH | Age: 25
End: 2025-01-10
Payer: COMMERCIAL

## 2025-01-10 DIAGNOSIS — J06.9 VIRAL URI: Primary | ICD-10-CM

## 2025-01-10 RX ORDER — BENZONATATE 200 MG/1
200 CAPSULE ORAL 3 TIMES DAILY PRN
Qty: 21 CAPSULE | Refills: 0 | Status: SHIPPED | OUTPATIENT
Start: 2025-01-10

## 2025-01-10 RX ORDER — PREDNISONE 10 MG/1
TABLET ORAL DAILY
Qty: 21 EACH | Refills: 0 | Status: SHIPPED | OUTPATIENT
Start: 2025-01-10 | End: 2025-01-16

## 2025-01-10 NOTE — PROGRESS NOTES
Subjective   Chief Complaint   Patient presents with    URI       Bernie Renae is a 24 y.o. female.     History of Present Illness  Patient reports a worsening cough cough that started about 4 days ago.  Cough is becoming more forceful,  hard and more frequent.  Cough is starting to hurt her chest and cause soreness.  She can hear a slight wheeze when she takes a deep breath to cough sometimes.  Cough is productive with chunky sputum.  Denies fever.  Denies known sick contacts.  URI   This is a new problem. Episode onset: 4 days. The problem has been unchanged. There has been no fever. Associated symptoms include congestion, coughing and wheezing. Pertinent negatives include no headaches, nausea, sinus pain or vomiting. Treatments tried: decongestants.        No Known Allergies    Past Medical History:   Diagnosis Date    Anemia     Kidney stones        Past Surgical History:   Procedure Laterality Date    APPENDECTOMY         Social History     Socioeconomic History    Marital status:    Tobacco Use    Smoking status: Never    Smokeless tobacco: Never   Vaping Use    Vaping status: Never Used   Substance and Sexual Activity    Alcohol use: Never    Drug use: Never    Sexual activity: Defer       History reviewed. No pertinent family history.      Current Outpatient Medications:     benzonatate (TESSALON) 200 MG capsule, Take 1 capsule by mouth 3 (Three) Times a Day As Needed for Cough., Disp: 21 capsule, Rfl: 0    dextromethorphan 15 MG/5ML syrup, Take 10 mL by mouth 4 (Four) Times a Day As Needed for Cough., Disp: 240 mL, Rfl: 0    potassium citrate (UROCIT-K) 10 MEQ (1080 MG) CR tablet, Take 1 tablet by mouth Daily., Disp: 90 tablet, Rfl: 1    predniSONE (DELTASONE) 10 MG (21) dose pack, Take  by mouth Daily for 6 days., Disp: 21 each, Rfl: 0    Rimegepant Sulfate (NURTEC) 75 MG tablet dispersible tablet, Take 1 tablet by mouth Daily As Needed (migraine headache)., Disp: 16 tablet, Rfl: 4      Review of  Systems   Constitutional:  Positive for activity change and fatigue. Negative for chills, diaphoresis and fever.   HENT:  Positive for congestion. Negative for sinus pressure and voice change.    Respiratory:  Positive for cough and wheezing. Negative for chest tightness and shortness of breath.    Gastrointestinal:  Negative for nausea and vomiting.   Musculoskeletal:  Positive for myalgias.   Neurological:  Negative for headache.        There were no vitals filed for this visit.    Objective   Physical Exam  Constitutional:       General: She is not in acute distress.     Appearance: Normal appearance. She is not ill-appearing, toxic-appearing or diaphoretic.   HENT:      Head: Normocephalic.      Mouth/Throat:      Lips: Pink.      Mouth: Mucous membranes are moist.   Pulmonary:      Effort: Pulmonary effort is normal.   Neurological:      Mental Status: She is alert and oriented to person, place, and time.          Procedures     Assessment & Plan   Diagnoses and all orders for this visit:    1. Viral URI (Primary)  -     predniSONE (DELTASONE) 10 MG (21) dose pack; Take  by mouth Daily for 6 days.  Dispense: 21 each; Refill: 0  -     benzonatate (TESSALON) 200 MG capsule; Take 1 capsule by mouth 3 (Three) Times a Day As Needed for Cough.  Dispense: 21 capsule; Refill: 0  -     dextromethorphan 15 MG/5ML syrup; Take 10 mL by mouth 4 (Four) Times a Day As Needed for Cough.  Dispense: 240 mL; Refill: 0            PLAN: Discussed dosing, side effects, recommended other symptomatic care.  Patient should follow up with primary care provider, Urgent Care or ER if symptoms worsen, fail to resolve or other symptoms need attention. Patient/family agree to the above.         MIRZA Vincent     Mode of Visit: Video  Location of patient: -HOME-  Location of provider: +HOME+  You have chosen to receive care through a telehealth visit.  The patient has signed the video visit consent form.  The visit included audio and  video interaction. No technical issues occurred during this visit.    The use of a video visit has been reviewed with the patient and verbal informed consent has been obtained. Myself and Bernie Renae participated in this visit. The patient is located at [address] [place] [address]. I am located in [place], [place]. The [employer] and Zoom were utilized.        This visit was performed via Telehealth.  This patient has been instructed to follow-up with their primary care provider if their symptoms worsen or the treatment provided does not resolve their illness.

## 2025-02-05 ENCOUNTER — OFFICE VISIT (OUTPATIENT)
Dept: NEUROLOGY | Facility: CLINIC | Age: 25
End: 2025-02-05
Payer: COMMERCIAL

## 2025-02-05 ENCOUNTER — SPECIALTY PHARMACY (OUTPATIENT)
Dept: NEUROLOGY | Facility: CLINIC | Age: 25
End: 2025-02-05
Payer: COMMERCIAL

## 2025-02-05 VITALS
SYSTOLIC BLOOD PRESSURE: 120 MMHG | WEIGHT: 111.11 LBS | OXYGEN SATURATION: 98 % | DIASTOLIC BLOOD PRESSURE: 78 MMHG | HEART RATE: 91 BPM | HEIGHT: 63 IN | BODY MASS INDEX: 19.69 KG/M2

## 2025-02-05 DIAGNOSIS — G43.E09 CHRONIC MIGRAINE WITH AURA WITHOUT STATUS MIGRAINOSUS, NOT INTRACTABLE: Primary | ICD-10-CM

## 2025-02-05 PROBLEM — G43.909 HEADACHE, MIGRAINE: Status: ACTIVE | Noted: 2025-02-05

## 2025-02-05 RX ORDER — SUMATRIPTAN 50 MG/1
TABLET, FILM COATED ORAL
Qty: 9 TABLET | Refills: 2 | Status: SHIPPED | OUTPATIENT
Start: 2025-02-05

## 2025-02-05 RX ORDER — DROSPIRENONE AND ETHINYL ESTRADIOL 0.03MG-3MG
KIT ORAL
COMMUNITY
Start: 2025-02-04 | End: 2025-02-05

## 2025-02-05 RX ORDER — FREMANEZUMAB-VFRM 225 MG/1.5ML
225 INJECTION SUBCUTANEOUS
Qty: 4.5 ML | Refills: 3 | Status: SHIPPED | OUTPATIENT
Start: 2025-02-05

## 2025-02-05 NOTE — PROGRESS NOTES
Specialty Pharmacy Patient Management Program  Neurology Initial Assessment     Bernie Renae is a 24 y.o. female with migraines seen by a Neurology provider and enrolled in the Neurology Patient Management program offered by [facility].  An initial outreach was conducted, including assessment of therapy appropriateness and specialty medication education for Ajovy. The patient was introduced to services offered by [facility], including: regular assessments, refill coordination, curbside pick-up or mail order delivery options, prior authorization maintenance, and financial assistance programs as applicable. The patient was also provided with contact information for the pharmacy team.     Insurance Coverage & Financial Support  [employer]  Ajovy Copay Card    Relevant Past Medical History and Comorbidities  Relevant medical history and concomitant health conditions were discussed with the patient. The patient's chart has been reviewed for relevant past medical history and comorbid health conditions and updated as necessary.   Past Medical History:   Diagnosis Date    Anemia     Kidney stones      Social History     Socioeconomic History    Marital status:    Tobacco Use    Smoking status: Never     Passive exposure: Never    Smokeless tobacco: Never   Vaping Use    Vaping status: Never Used   Substance and Sexual Activity    Alcohol use: Never    Drug use: Never    Sexual activity: Defer     Problem list reviewed by Radha Jade RPH on 2/5/2025 at  3:58 PM    Allergies  Known allergies and reactions were discussed with the patient. The patient's chart has been reviewed for  allergy information and updated as necessary.   No Known Allergies  Allergies reviewed by Radha Jade RPH on 2/5/2025 at  3:58 PM    Relevant Laboratory Values  Common labs          9/30/2024    08:53   Common Labs   Glucose 76    BUN 14    Creatinine 0.74    Sodium 138     Potassium 4.3    Chloride 102    Calcium 9.4    Total Protein 6.6    Albumin 4.1    Total Bilirubin 0.3    Alkaline Phosphatase 66    AST (SGOT) 16    ALT (SGPT) 13    WBC 7.45    Hemoglobin 13.5    Hematocrit 40.5    Platelets 363    Total Cholesterol 199    Triglycerides 129    HDL Cholesterol 79    LDL Cholesterol  98        Current Medication List  This medication list has been reviewed with the patient and evaluated for any interactions or necessary modifications/recommendations, and updated to include all prescription medications, OTC medications, and supplements the patient is currently taking.  This list reflects what is contained in the patient's profile, which has also been marked as reviewed to communicate to other providers it is the most up to date version of the patient's current medication therapy.     Current Outpatient Medications:     Fremanezumab-vfrm (Ajovy) 225 MG/1.5ML solution auto-injector, Inject 225 mg under the skin into the appropriate area as directed Every 30 (Thirty) Days., Disp: 4.5 mL, Rfl: 3    multivitamin with minerals (MULTIVITAMIN ADULT PO), Take 1 tablet by mouth Daily., Disp: , Rfl:     potassium citrate (UROCIT-K) 10 MEQ (1080 MG) CR tablet, Take 1 tablet by mouth Daily., Disp: 90 tablet, Rfl: 1    SUMAtriptan (IMITREX) 50 MG tablet, Take one tablet at onset of headache. May repeat dose one time in 2 hours if headache not relieved. No more than 2 doses in 24 hours., Disp: 9 tablet, Rfl: 2    Medicines reviewed by Radha Jade [facility] on 2/5/2025 at  3:58 PM    Drug Interactions  None     Initial Education Provided for Specialty Medication  The patient has been provided with the following education and any applicable administration techniques (i.e. self-injection) have been demonstrated for the therapies indicated. All questions and concerns have been addressed prior to the patient receiving the medication, and the patient has verbalized understanding of the education  and any materials provided.  Additional patient education shall be provided and documented upon request by the patient, provider or payer.             Ajovy (fremanezumab-vfrm) 225 mg subQ every 28 days        Medication Expectations   Why am I taking this medication? You are taking this medication for migraine prophylaxis.   What should I expect while on this medication? You should expect to a decrease in the frequency and severity of your migraines.   How does the medication work? Ajovy is a monoclonal antibody that binds to calcitonin gene-related peptide (CGRP) and blocks its binding to the receptor decreasing the severity of migraines.   How long will I be on this medication for? The amount of time you will be on this medication will be determined by your doctor and your response to the medication.    How do I take this medication? Take as directed on your prescription label. This medication is a self-injection given every 28 days.    What are some possible side effects? Injection site reactions and hypersensitivity reactions.   What happens if I miss a dose? If you miss a dose, take it as soon as you remember, and time next dose 28 days from last dose.                  Medication Safety   What are things I should warn my doctor immediately about? Cases of anaphylaxis and angioedema have been reported in the postmarketing setting. If a reaction occurs, notify your doctor immediately.   What are things that I should be cautious of? Injection site reaction and hypersensitivity reactions, including rash, pruritus, drug hypersensitivity, and urticaria   What are some medications that can interact with this one? No drug interactions identified.            Medication Storage/Handling   How should I handle this medication? Keep this medication our of reach of pets/children in original container.  On the day your Ajovy is due let it set at room temperature for 30 minutes prior to injection. (do NOT warm using a heat  source such as hot water or a microwave).  Administer in the abdomen, thigh, back of the upper arm, or buttocks.  Do not inject where the skin is tender, bruised, red or hard.  Rotate injection sites.   How does this medication need to be stored? Store in refrigerator and keep dry.   How should I dispose of this medication? You can dispose of the empty syringe in a sharps container, and if this is not available you may use an empty hard plastic container such as a milk jug or tide container.            Resources/Support   How can I remind myself to take this medication? You can download a reminder annmarie on your phone or use a calandar  to help with your monthly injection.   Is financial support available?  Yes, [employer] can provide co-pay cards if you have commercial insurance or patient assistance if you have Medicare or no insurance.    Which vaccines are recommended for me? Talk to your doctor about these vaccines: Flu, Coronavirus (COVID-19), Pneumococcal (pneumonia), Tdap, Hepatitis B, Zoster (shingles)                   Adherence and Self-Administration  Adherence related to the patient's specialty therapy was discussed with the patient. The Adherence segment of this outreach has been reviewed and updated.   Is there a concern with patient's ability to self administer the medication correctly and without issue?: No  Were any potential barriers to adherence identified during the initial assessment or patient education?: No  Are there any concerns regarding the patient's understanding of the importance of medication adherence?: No  Methods for Supporting Patient Adherence and/or Self-Administration: None    Goals of Therapy  Goals related to the patient's specialty therapy were discussed with the patient. The Patient Goals segment of this outreach has been reviewed and updated.   Goals Addressed Today        Specialty Pharmacy General Goal      On Average, Reduce:   Frequency of migraines to 6 per  month.   Symptom severity by 50% within 1 hour of taking acute therapy.   Duration of migraines to 2 hours.     Baseline Values/Notes on Enrollment  Frequency: 12 MMD  Symptom Severity: pain, photophobia, phonophobia  Duration: Several hours    Date of Reassessment Notes on Progress Toward Above Goals   2/5/25 Initiate Ajovy                                                        Reassessment Plan & Follow-Up  Medication Therapy Changes: Initiate Ajovy 225 mg subcutaneously monthly  Related Plans, Therapy Recommendations, or Therapy Problems to Be Addressed: None  Pharmacist to perform regular reassessments no more than (6) months from the previous assessment.  Care Coordinator to set up future refill outreaches, coordinate prescription delivery, and escalate clinical questions to pharmacist.   Welcome information and patient satisfaction survey to be sent by specialty pharmacy team with patient's initial fill.    Attestation  Therapeutic appropriateness: Appropriate   I attest the patient was actively involved in and has agreed to the above plan of care. If the prescribed therapy is at any point deemed not appropriate based on the current or future assessments, a consultation will be initiated with the patient's specialty care provider to determine the best course of action. The revised plan of therapy will be documented along with any additional patient education provided. Discussed aforementioned material with patient via telemedicine.    Radha Jade, PharmD, [facility]  Clinic Specialty Pharmacist, Neurology  2/5/2025  15:59 EST

## 2025-02-05 NOTE — PROGRESS NOTES
Neuro Office Visit      Encounter Date: 2025   Patient Name: Bernie Renae  : 2000   MRN: [id number]   PCP:  Lashell Ward APRN     Chief Complaint:    Chief Complaint   Patient presents with    new patient     migraines       History of Present Illness: Bernie Renae is a 24 y.o. female who is here today in Neurology for new patient for migraines    Initial consult 2025:  PMH of 2 kidney stones (which is why she takes potassium citrate), appendectomy and wisdom teeth removed    In clinic today Bernie reports that migraines started 2023. Headache is left sided. Happening on average 3 times per week, will usually last until she can rest. Nurtec only works when she is able to decrease stimulation. Can have light/sound sensitivity. Some nausea/vomiting. Eyes will get blurry and will hurt. She did see her eye doctor about 3 months ago and was told that eyes were healthy. FH in mother, aunt, grandmother, aunt, grandfather. She does take Excedrin migraine and tylenol in a week. She was previously prescribed venlafaxine (SE - HR elevation and generally felt awful and no benefit), rizatriptan which worked well but made her feel dizzy. No side effects with Nurtec, but reports that it is not covered by insurance. She reports that sleep is typically good but typically sleeps well. No prior imaging. 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 She notes that stimulation and loud noises are triggers for her migraines.     Subjective      Review of Systems   Eyes:  Positive for photophobia.   Respiratory: Negative.     Cardiovascular: Negative.    Gastrointestinal:  Positive for nausea and vomiting.   Genitourinary: Negative.    Musculoskeletal: Negative.    Skin: Negative.    Neurological:  Positive for headaches.   Psychiatric/Behavioral:  Positive for sleep disturbance.           Past Medical History:   Past  "Medical History:   Diagnosis Date    Anemia     Kidney stones        Past Surgical History:   Past Surgical History:   Procedure Laterality Date    APPENDECTOMY         Family History: History reviewed. No pertinent family history.    Social History:   Social History     Socioeconomic History    Marital status:    Tobacco Use    Smoking status: Never     Passive exposure: Never    Smokeless tobacco: Never   Vaping Use    Vaping status: Never Used   Substance and Sexual Activity    Alcohol use: Never    Drug use: Never    Sexual activity: Defer       Medications:     Current Outpatient Medications:     multivitamin with minerals (MULTIVITAMIN ADULT PO), Take 1 tablet by mouth Daily., Disp: , Rfl:     potassium citrate (UROCIT-K) 10 MEQ (1080 MG) CR tablet, Take 1 tablet by mouth Daily., Disp: 90 tablet, Rfl: 1    SUMAtriptan (IMITREX) 50 MG tablet, Take one tablet at onset of headache. May repeat dose one time in 2 hours if headache not relieved. No more than 2 doses in 24 hours., Disp: 9 tablet, Rfl: 2    Allergies:   No Known Allergies    Objective     Objective:    /78   Pulse 91   Ht 160 cm (62.99\")   Wt 50.4 kg (111 lb 1.8 oz)   SpO2 98%   BMI 19.69 kg/m²   Body mass index is 19.69 kg/m².    Physical Exam  Vitals reviewed.   Constitutional:       Appearance: Normal appearance.   HENT:      Head: Normocephalic and atraumatic.      Mouth/Throat:      Mouth: Mucous membranes are moist.      Pharynx: Oropharynx is clear.   Pulmonary:      Effort: Pulmonary effort is normal. No respiratory distress.   Skin:     General: Skin is warm and dry.   Neurological:      Mental Status: She is alert.          Neurology Exam:    General apperance: NAD.     Mental status: Alert, awake and oriented to time place and person.    Fund of knowledge:  Normal.     Language and Speech: No aphasia or dysarthria.    Naming , Repetition and Comprehension:  Can name objects, repeat a sentence and follow commands. Speech is " clear and fluent with good repetition, comprehension, and naming.    Cranial Nerves:   CN II: Visual fields are full. Intact. Pupils - PERRLA  CN III, IV and VI: Extraocular movements are intact. Normal saccades.   CN V: Facial sensation is intact.   CN VII: Muscles of facial expression reveal no asymmetry. Intact.   CN VIII: Hearing is intact.  CN IX and X: Palate elevates symmetrically. Intact  CN XI: Shoulder shrug is intact.   CN XII: Tongue is midline without evidence of atrophy or fasciculation.     Motor:  Right UE muscle strength 5/5. Normal tone.     Left UE muscle strength 5/5. Normal tone.      Right LE muscle strength 5/5. Normal tone.     Left LE muscle strength 5/5. Normal tone.      Sensory: Normal light touch sensation bilaterally.    DTRs: 2+ bilaterally in upper and lower extremities.    Coordination: Normal finger-to-nose, heel to shin B/L.    Gait: Normal.          Results:   Imaging:   No Images in the past 120 days found..     Labs:   Lab Results   Component Value Date    GLUCOSE 76 09/30/2024    BUN 14 09/30/2024    CREATININE 0.74 09/30/2024     09/30/2024    K 4.3 09/30/2024     09/30/2024    CALCIUM 9.4 09/30/2024    ALBUMIN 4.1 09/30/2024    ALT 13 09/30/2024    AST 16 09/30/2024    ALKPHOS 66 09/30/2024    BILITOT 0.3 09/30/2024    BCR 18.9 09/30/2024     WBC   Date Value Ref Range Status   09/30/2024 7.45 3.40 - 10.80 10*3/mm3 Final     RBC   Date Value Ref Range Status   09/30/2024 4.32 3.77 - 5.28 10*6/mm3 Final     Hemoglobin   Date Value Ref Range Status   09/30/2024 13.5 12.0 - 15.9 g/dL Final     Hematocrit   Date Value Ref Range Status   09/30/2024 40.5 34.0 - 46.6 % Final     MCV   Date Value Ref Range Status   09/30/2024 93.8 79.0 - 97.0 fL Final     MCH   Date Value Ref Range Status   09/30/2024 31.3 26.6 - 33.0 pg Final     MCHC   Date Value Ref Range Status   09/30/2024 33.3 31.5 - 35.7 g/dL Final     RDW   Date Value Ref Range Status   09/30/2024 11.2 (L) 12.3 -  15.4 % Final     Platelets   Date Value Ref Range Status   09/30/2024 363 140 - 450 10*3/mm3 Final     Neutrophil Rel %   Date Value Ref Range Status   09/30/2024 55.9 42.7 - 76.0 % Final     Lymphocyte Rel %   Date Value Ref Range Status   09/30/2024 35.6 19.6 - 45.3 % Final     Monocyte Rel %   Date Value Ref Range Status   09/30/2024 6.2 5.0 - 12.0 % Final     Eosinophil Rel %   Date Value Ref Range Status   09/30/2024 1.2 0.3 - 6.2 % Final     Basophil Rel %   Date Value Ref Range Status   09/30/2024 0.7 0.0 - 1.5 % Final     Neutrophils Absolute   Date Value Ref Range Status   09/30/2024 4.17 1.70 - 7.00 10*3/mm3 Final     Lymphocytes Absolute   Date Value Ref Range Status   09/30/2024 2.65 0.70 - 3.10 10*3/mm3 Final     Monocytes Absolute   Date Value Ref Range Status   09/30/2024 0.46 0.10 - 0.90 10*3/mm3 Final     Eosinophils Absolute   Date Value Ref Range Status   09/30/2024 0.09 0.00 - 0.40 10*3/mm3 Final     Basophils Absolute   Date Value Ref Range Status   09/30/2024 0.05 0.00 - 0.20 10*3/mm3 Final     nRBC   Date Value Ref Range Status   09/30/2024 0.0 0.0 - 0.2 /100 WBC Final         Assessment / Plan      Assessment/Plan:   Diagnoses and all orders for this visit:    1. Chronic migraine with aura without status migrainosus, not intractable (Primary)  -     SUMAtriptan (IMITREX) 50 MG tablet; Take one tablet at onset of headache. May repeat dose one time in 2 hours if headache not relieved. No more than 2 doses in 24 hours.  Dispense: 9 tablet; Refill: 2       Murphy is in neurology clinic to establish care for migraines, she has previously tried Venlafaxine, Rizatriptan, and is currently taking Nurtec. She has seen good response with Nurtec however it is not approved by insurance. Will send Sumatriptan to try instead of Rizatriptan, she has been counseled regarding triptan dosing guidelines, she is to notify clinic with her response to sumatriptan, if she has significant side effects or it is not  efficacious we could try to pursue PA after 2 triptan trials for Nurtec.  I also discussed with our pharmacy team who will be sending Ajovy 225 mg monthly injectable for migraine prevention.  She was instructed on how to perform autoinjector and was able to practice this in clinic today. She is not currently pregnant and does not plan on becoming pregnant anytime soon, we did discuss that should this change in the future we would need to stop injectable at least 6 months prior to plans of trying to conceive.  Of note did not consider Topamax as preventative option given history of 2 prior kidney stones and preventative oral treatment for kidney stones of potassium citrate that she is currently taking.  Will plan to see Murphy back in clinic in 3 months or sooner for any questions or concerns.       Patient Education:       Reviewed medications, potential side effects and signs and symptoms to report. Discussed risk versus benefits of treatment plan with patient and/or family-including medications, labs and radiology that may be ordered. Addressed questions and concerns during visit. Patient and/or family verbalized understanding and agree with plan. Instructed to call the office with any questions and report to ER with any life-threatening symptoms.     Follow Up:   Return in about 3 months (around 5/5/2025).    I spent 30  minutes face to face with the patient. I personally spent 50 percent of this time counseling and discussing diagnosis, evaluation, treatment options, and management .       During this visit the following were done:  Labs Reviewed [x]    Labs Ordered []    Radiology Reports Reviewed []    Radiology Ordered []    PCP Records Reviewed [x]    Referring Provider Records Reviewed []    ER Records Reviewed []    Hospital Records Reviewed []    History Obtained From Family []    Radiology Images Reviewed []    Other Reviewed []    Records Requested []      MIRZA Arora  [facility]  [facility]  [address]  [facility] [address] [phone]

## 2025-04-10 ENCOUNTER — SPECIALTY PHARMACY (OUTPATIENT)
Dept: GENERAL RADIOLOGY | Facility: HOSPITAL | Age: 25
End: 2025-04-10
Payer: COMMERCIAL

## 2025-04-10 RX ORDER — GALCANEZUMAB 120 MG/ML
240 INJECTION, SOLUTION SUBCUTANEOUS ONCE
Qty: 2 ML | Refills: 0 | Status: SHIPPED | OUTPATIENT
Start: 2025-04-10 | End: 2025-04-10

## 2025-04-10 RX ORDER — GALCANEZUMAB 120 MG/ML
120 INJECTION, SOLUTION SUBCUTANEOUS
Qty: 1 ML | Refills: 10 | Status: SHIPPED | OUTPATIENT
Start: 2025-04-10

## 2025-04-10 NOTE — PROGRESS NOTES
Specialty Pharmacy Patient Management Program  Neurology Initial Assessment     Bernie Renae is a 24 y.o. female with migraines seen by a Neurology provider and enrolled in the Neurology Patient Management program offered by [facility].  An initial outreach was conducted, including assessment of therapy appropriateness and specialty medication education for Emgality. The patient was introduced to services offered by [facility], including: regular assessments, refill coordination, curbside pick-up or mail order delivery options, prior authorization maintenance, and financial assistance programs as applicable. The patient was also provided with contact information for the pharmacy team.     Insurance Coverage & Financial Support  [facility]  Emgality Copay Card    Relevant Past Medical History and Comorbidities  Relevant medical history and concomitant health conditions were discussed with the patient. The patient's chart has been reviewed for relevant past medical history and comorbid health conditions and updated as necessary.   Past Medical History:   Diagnosis Date    Anemia     Kidney stones      Social History     Socioeconomic History    Marital status:    Tobacco Use    Smoking status: Never     Passive exposure: Never    Smokeless tobacco: Never   Vaping Use    Vaping status: Never Used   Substance and Sexual Activity    Alcohol use: Never    Drug use: Never    Sexual activity: Defer     Problem list reviewed by Radha Jade RPH on 4/10/2025 at 11:12 AM    Allergies  Known allergies and reactions were discussed with the patient. The patient's chart has been reviewed for  allergy information and updated as necessary.   Allergies   Allergen Reactions    Ajovy [Fremanezumab-Vfrm] Itching     Injection site reaction of itchiness/erythema     Allergies reviewed by Radha Jade RPH on 4/10/2025 at 11:12 AM    Relevant Laboratory  Values  Common labs          9/30/2024    08:53   Common Labs   Glucose 76    BUN 14    Creatinine 0.74    Sodium 138    Potassium 4.3    Chloride 102    Calcium 9.4    Albumin 4.1    Total Bilirubin 0.3    Alkaline Phosphatase 66    AST (SGOT) 16    ALT (SGPT) 13    WBC 7.45    Hemoglobin 13.5    Hematocrit 40.5    Platelets 363    Total Cholesterol 199    Triglycerides 129    HDL Cholesterol 79    LDL Cholesterol  98        Current Medication List  This medication list has been reviewed with the patient and evaluated for any interactions or necessary modifications/recommendations, and updated to include all prescription medications, OTC medications, and supplements the patient is currently taking.  This list reflects what is contained in the patient's profile, which has also been marked as reviewed to communicate to other providers it is the most up to date version of the patient's current medication therapy.     Current Outpatient Medications:     galcanezumab-gnlm (Emgality) 120 MG/ML auto-injector pen, Inject 2 mL under the skin into the appropriate area as directed 1 (One) Time for 1 dose., Disp: 2 mL, Rfl: 0    galcanezumab-gnlm (Emgality) 120 MG/ML auto-injector pen, Inject 1 mL under the skin into the appropriate area as directed Every 30 (Thirty) Days., Disp: 1 mL, Rfl: 10    multivitamin with minerals (MULTIVITAMIN ADULT PO), Take 1 tablet by mouth Daily., Disp: , Rfl:     potassium citrate (UROCIT-K) 10 MEQ (1080 MG) CR tablet, Take 1 tablet by mouth Daily., Disp: 90 tablet, Rfl: 1    SUMAtriptan (IMITREX) 50 MG tablet, Take one tablet at onset of headache. May repeat dose one time in 2 hours if headache not relieved. No more than 2 doses in 24 hours., Disp: 9 tablet, Rfl: 2    Medicines reviewed by Radha Jade [facility] on 4/10/2025 at 11:12 AM    Drug Interactions  None     Initial Education Provided for Specialty Medication  The patient has been provided with the following education and any  applicable administration techniques (i.e. self-injection) have been demonstrated for the therapies indicated. All questions and concerns have been addressed prior to the patient receiving the medication, and the patient has verbalized understanding of the education and any materials provided.  Additional patient education shall be provided and documented upon request by the patient, provider or payer.              Emgality (Galcanezumab-gnlm)        Medication Expectations   Why am I taking this medication? You are taking this medication for migraine prophylaxis.   What should I expect while on this medication? You should expect to a decrease in the frequency and severity of your migraines.   How does the medication work? Emgality is a monoclonal antibody that binds to calcitonin gene-related peptide (CGRP) and blocks its binding to the receptor decreasing the severity of migraines.   How long will I be on this medication for? The amount of time you will be on this medication will be determined by your doctor and your response to the medication.    How do I take this medication? Take as directed on your prescription label. This medication is a self-injection given every 28 days.    What are some possible side effects? Injection site reactions and hypersensitivity reactions.   What happens if I miss a dose? If you miss a dose, take it as soon as you remember, and time next dose 28 days from last dose.                  Medication Safety   What are things I should warn my doctor immediately about? Hypersensitivity reactions.   What are things that I should be cautious of? Injection site reaction.   What are some medications that can interact with this one? No drug interactions identified.            Medication Storage/Handling   How should I handle this medication? Keep this medication our of reach of pets/children in original container.  On the day your Emgality is due let it set at room temperature for 30 minutes  prior to injection. (do NOT warm using a heat source such as hot water or a microwave).  Administer in the abdomen, thigh, back of the upper arm, or buttocks.  Do not inject where the skin is tender, bruised, red or hard.  Rotate injection sites.   How does this medication need to be stored? Store in refrigerator and keep dry.   How should I dispose of this medication? You can dispose of the empty syringe in a sharps container, and if this is not available you may use an empty hard plastic container such as a milk jug or tide container.            Resources/Support   How can I remind myself to take this medication? You can download a reminder annmarie on your phone or use a calandar  to help with your monthly injection.   Is financial support available?  Yes, [employer] can provide co-pay cards if you have commercial insurance or patient assistance if you have Medicare or no insurance.    Which vaccines are recommended for me? Talk to your doctor about these vaccines: Flu, Coronavirus (COVID-19), Pneumococcal (pneumonia), Tdap, Hepatitis B, Zoster (shingles)                 Adherence and Self-Administration  Adherence related to the patient's specialty therapy was discussed with the patient. The Adherence segment of this outreach has been reviewed and updated.   Is there a concern with patient's ability to self administer the medication correctly and without issue?: No  Were any potential barriers to adherence identified during the initial assessment or patient education?: No  Are there any concerns regarding the patient's understanding of the importance of medication adherence?: No  Methods for Supporting Patient Adherence and/or Self-Administration: None    Goals of Therapy  Goals related to the patient's specialty therapy were discussed with the patient. The Patient Goals segment of this outreach has been reviewed and updated.   Goals Addressed Today        Specialty Pharmacy General Goal      On Average, Reduce:    Frequency of migraines to 6 per month.   Symptom severity by 50% within 1 hour of taking acute therapy.   Duration of migraines to 2 hours.     Baseline Values/Notes on Enrollment  Frequency: 12 MMD  Symptom Severity: pain, photophobia, phonophobia  Duration: Several hours    Date of Reassessment Notes on Progress Toward Above Goals   2/5/25 Initiate Ajovy   4/10/25 Slight injection site reaction with Ajovy, switching to Emgality                                                    Reassessment Plan & Follow-Up  Medication Therapy Changes: Discontinue Ajovy 225 mg subcutaneously monthly and initiate Emgality 240 mg subcutaneously once followed in 28-30 days by 120 mg subcutaneously monthly  Related Plans, Therapy Recommendations, or Therapy Problems to Be Addressed: Reviewed  Specialty Pharmacy Services.   Pharmacist to perform regular reassessments no more than (6) months from the previous assessment.  Care Coordinator to set up future refill outreaches, coordinate prescription delivery, and escalate clinical questions to pharmacist.   Welcome information and patient satisfaction survey to be sent by specialty pharmacy team with patient's initial fill.    Attestation  Therapeutic appropriateness: Appropriate   I attest the patient was actively involved in and has agreed to the above plan of care. If the prescribed therapy is at any point deemed not appropriate based on the current or future assessments, a consultation will be initiated with the patient's specialty care provider to determine the best course of action. The revised plan of therapy will be documented along with any additional patient education provided. Discussed aforementioned material with patient via telemedicine.    Radha Jade, PharmD, CARLITOS  [facility] Specialty Pharmacist, Neurology  4/10/2025  11:13 EDT

## 2025-04-15 ENCOUNTER — SPECIALTY PHARMACY (OUTPATIENT)
Dept: GENERAL RADIOLOGY | Facility: HOSPITAL | Age: 25
End: 2025-04-15
Payer: COMMERCIAL

## 2025-05-09 ENCOUNTER — OFFICE VISIT (OUTPATIENT)
Dept: NEUROLOGY | Facility: CLINIC | Age: 25
End: 2025-05-09
Payer: COMMERCIAL

## 2025-05-09 VITALS
HEIGHT: 63 IN | OXYGEN SATURATION: 100 % | WEIGHT: 111 LBS | BODY MASS INDEX: 19.67 KG/M2 | SYSTOLIC BLOOD PRESSURE: 100 MMHG | DIASTOLIC BLOOD PRESSURE: 70 MMHG | HEART RATE: 94 BPM

## 2025-05-09 DIAGNOSIS — G43.E09 CHRONIC MIGRAINE WITH AURA WITHOUT STATUS MIGRAINOSUS, NOT INTRACTABLE: Primary | ICD-10-CM

## 2025-05-09 NOTE — PROGRESS NOTES
Neuro Office Visit      Encounter Date: 2025   Patient Name: Bernie Renae  : 2000   MRN: [id number]   PCP:  Lashell Ward APRN     Chief Complaint:    Chief Complaint   Patient presents with    Follow-up     Chronic migraine with aura without status migrainosus, not intractable         History of Present Illness: Bernie Renae is a 24 y.o. female who is here today in Neurology for new patient for migraines    Initial consult 2025:  PMH of 2 kidney stones (which is why she takes potassium citrate), appendectomy and wisdom teeth removed    In clinic today Bernie reports that migraines started 2023. Headache is left sided. Happening on average 3 times per week, will usually last until she can rest. Nurtec only works when she is able to decrease stimulation. Can have light/sound sensitivity. Some nausea/vomiting. Eyes will get blurry and will hurt. She did see her eye doctor about 3 months ago and was told that eyes were healthy. FH in mother, aunt, grandmother, aunt, grandfather. She does take Excedrin migraine and tylenol in a week. She was previously prescribed venlafaxine (SE - HR elevation and generally felt awful and no benefit), rizatriptan which worked well but made her feel dizzy. No side effects with Nurtec, but reports that it is not covered by insurance. She reports that sleep is typically good but typically sleeps well. No prior imaging. 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 She notes that stimulation and loud noises are triggers for her migraines.     Current visit 2025:  Bernie returns for routine follow up. She had significant improvement with Ajovy, on average 3-4 migraines/month. No change in migraine characteristics. Unfortunately she had injection site reaction with Ajovy so she was switching to Emgality, she has done one Emgality injection so far and is not having any  injection site irritation as of yet. She has only had to take Sumatriptan once, it worked to get rid of headache but did make her tired. Excedrin works now as well, she previously did not get benefit from Excedrin. No changes in health since last clinic visit. She is not currently pregnant.  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    Subjective      Review of Systems   Eyes:  Positive for photophobia.   Respiratory: Negative.     Cardiovascular: Negative.    Gastrointestinal:  Positive for nausea and vomiting.   Genitourinary: Negative.    Musculoskeletal: Negative.    Skin: Negative.    Neurological:  Positive for headaches.   Psychiatric/Behavioral:  Positive for sleep disturbance.           Past Medical History:   Past Medical History:   Diagnosis Date    Anemia     Kidney stones        Past Surgical History:   Past Surgical History:   Procedure Laterality Date    APPENDECTOMY         Family History: History reviewed. No pertinent family history.    Social History:   Social History     Socioeconomic History    Marital status:    Tobacco Use    Smoking status: Never     Passive exposure: Never    Smokeless tobacco: Never   Vaping Use    Vaping status: Never Used   Substance and Sexual Activity    Alcohol use: Never    Drug use: Never    Sexual activity: Defer       Medications:     Current Outpatient Medications:     galcanezumab-gnlm (Emgality) 120 MG/ML auto-injector pen, Inject 1 mL under the skin into the appropriate area as directed Every 30 (Thirty) Days., Disp: 1 mL, Rfl: 10    multivitamin with minerals (MULTIVITAMIN ADULT PO), Take 1 tablet by mouth Daily., Disp: , Rfl:     potassium citrate (UROCIT-K) 10 MEQ (1080 MG) CR tablet, Take 1 tablet by mouth Daily., Disp: 90 tablet, Rfl: 1    SUMAtriptan (IMITREX) 50 MG tablet, Take one tablet at onset of headache. May repeat dose one time in 2 hours if  "headache not relieved. No more than 2 doses in 24 hours., Disp: 9 tablet, Rfl: 2    Allergies:   Allergies   Allergen Reactions    Ajovy [Fremanezumab-Vfrm] Itching     Injection site reaction of itchiness/erythema       Objective     Objective:    /70   Pulse 94   Ht 160 cm (62.99\")   Wt 50.3 kg (111 lb)   SpO2 100%   Breastfeeding No   BMI 19.67 kg/m²   Body mass index is 19.67 kg/m².    Physical Exam  Vitals reviewed.   Constitutional:       Appearance: Normal appearance.   HENT:      Head: Normocephalic and atraumatic.      Mouth/Throat:      Mouth: Mucous membranes are moist.      Pharynx: Oropharynx is clear.   Pulmonary:      Effort: Pulmonary effort is normal. No respiratory distress.   Skin:     General: Skin is warm and dry.   Neurological:      Mental Status: She is alert.          Neurology Exam:    General apperance: NAD.     Mental status: Alert, awake and oriented to time place and person.    Fund of knowledge:  Normal.     Language and Speech: No aphasia or dysarthria.    Naming , Repetition and Comprehension:  Can name objects, repeat a sentence and follow commands. Speech is clear and fluent with good repetition, comprehension, and naming.    Cranial Nerves:   CN II: Visual fields are full. Intact. Pupils - PERRLA  CN III, IV and VI: Extraocular movements are intact. Normal saccades.   CN V: Facial sensation is intact.   CN VII: Muscles of facial expression reveal no asymmetry. Intact.   CN VIII: Hearing is intact.  CN IX and X: Palate elevates symmetrically. Intact  CN XI: Shoulder shrug is intact.   CN XII: Tongue is midline without evidence of atrophy or fasciculation.     Motor:  Right UE muscle strength 5/5. Normal tone.     Left UE muscle strength 5/5. Normal tone.      Right LE muscle strength 5/5. Normal tone.     Left LE muscle strength 5/5. Normal tone.      Sensory: Normal light touch sensation bilaterally.    DTRs: 2+ bilaterally in upper and lower " extremities.    Coordination: Normal finger-to-nose, heel to shin B/L.    Gait: Normal.          Results:   Imaging:   No Images in the past 120 days found..     Labs:   Lab Results   Component Value Date    GLUCOSE 76 09/30/2024    BUN 14 09/30/2024    CREATININE 0.74 09/30/2024     09/30/2024    K 4.3 09/30/2024     09/30/2024    CALCIUM 9.4 09/30/2024    PROTEINTOT 6.6 09/30/2024    ALBUMIN 4.1 09/30/2024    ALT 13 09/30/2024    AST 16 09/30/2024    ALKPHOS 66 09/30/2024    BILITOT 0.3 09/30/2024    GLOB 2.5 09/30/2024    AGRATIO 1.6 09/30/2024    BCR 18.9 09/30/2024    EGFR 116.0 09/30/2024     WBC   Date Value Ref Range Status   09/30/2024 7.45 3.40 - 10.80 10*3/mm3 Final     RBC   Date Value Ref Range Status   09/30/2024 4.32 3.77 - 5.28 10*6/mm3 Final     Hemoglobin   Date Value Ref Range Status   09/30/2024 13.5 12.0 - 15.9 g/dL Final     Hematocrit   Date Value Ref Range Status   09/30/2024 40.5 34.0 - 46.6 % Final     MCV   Date Value Ref Range Status   09/30/2024 93.8 79.0 - 97.0 fL Final     MCH   Date Value Ref Range Status   09/30/2024 31.3 26.6 - 33.0 pg Final     MCHC   Date Value Ref Range Status   09/30/2024 33.3 31.5 - 35.7 g/dL Final     RDW   Date Value Ref Range Status   09/30/2024 11.2 (L) 12.3 - 15.4 % Final     Platelets   Date Value Ref Range Status   09/30/2024 363 140 - 450 10*3/mm3 Final     Neutrophil Rel %   Date Value Ref Range Status   09/30/2024 55.9 42.7 - 76.0 % Final     Lymphocyte Rel %   Date Value Ref Range Status   09/30/2024 35.6 19.6 - 45.3 % Final     Monocyte Rel %   Date Value Ref Range Status   09/30/2024 6.2 5.0 - 12.0 % Final     Eosinophil Rel %   Date Value Ref Range Status   09/30/2024 1.2 0.3 - 6.2 % Final     Basophil Rel %   Date Value Ref Range Status   09/30/2024 0.7 0.0 - 1.5 % Final     Neutrophils Absolute   Date Value Ref Range Status   09/30/2024 4.17 1.70 - 7.00 10*3/mm3 Final     Lymphocytes Absolute   Date Value Ref Range Status    09/30/2024 2.65 0.70 - 3.10 10*3/mm3 Final     Monocytes Absolute   Date Value Ref Range Status   09/30/2024 0.46 0.10 - 0.90 10*3/mm3 Final     Eosinophils Absolute   Date Value Ref Range Status   09/30/2024 0.09 0.00 - 0.40 10*3/mm3 Final     Basophils Absolute   Date Value Ref Range Status   09/30/2024 0.05 0.00 - 0.20 10*3/mm3 Final     nRBC   Date Value Ref Range Status   09/30/2024 0.0 0.0 - 0.2 /100 WBC Final         Assessment / Plan      Assessment/Plan:   Diagnoses and all orders for this visit:    1. Chronic migraine with aura without status migrainosus, not intractable (Primary)         Murphy returns to neurology clinic for routine follow-up.  Her migraines have significantly improved since starting Ajovy however she was having injection site irritation so she was instead switched to Emgality.  She has not had any injection site issues or other side effects since switching to Emgality, she is tolerating this well.  Will continue Emgality injections unchanged today.  She does have sumatriptan as needed.  She has also taken Excedrin Migraine with relief as well.  Will plan to see back in clinic in 3 months or sooner for any questions or concerns.      Patient Education:       Reviewed medications, potential side effects and signs and symptoms to report. Discussed risk versus benefits of treatment plan with patient and/or family-including medications, labs and radiology that may be ordered. Addressed questions and concerns during visit. Patient and/or family verbalized understanding and agree with plan. Instructed to call the office with any questions.    Follow Up:   Return in about 3 months (around 8/9/2025).    I spent 25  minutes in the care of this patient. I personally spent 50 percent of this time counseling and discussing diagnosis, evaluation, treatment options, and management .       During this visit the following were done:  Labs Reviewed [x]    Labs Ordered []    Radiology Reports Reviewed  []    Radiology Ordered []    PCP Records Reviewed []    Referring Provider Records Reviewed []    ER Records Reviewed []    Hospital Records Reviewed []    History Obtained From Family []    Radiology Images Reviewed []    Other Reviewed []    Records Requested []      MIRZA Arora  [facility]  [address]  [facility] [address] [phone]

## 2025-05-20 ENCOUNTER — SPECIALTY PHARMACY (OUTPATIENT)
Dept: NEUROLOGY | Facility: CLINIC | Age: 25
End: 2025-05-20
Payer: COMMERCIAL

## 2025-05-20 NOTE — PROGRESS NOTES
Specialty Pharmacy Patient Management Program  One-Time Clinical Outreach     Bernie Renae is a 24 y.o. female seen by a Neurology provider for migraines and enrolled in the Neurology Patient Management program offered by [facility].      Patient noted during refill questionnaire she will be changing jobs. Once her new insurance becomes active we will pursue prior authorization under the new plan.    Radha Jade, PharmD, CARLITOS  [facility] Specialty Pharmacist, Neurology  5/20/2025  08:18 EDT

## 2025-05-20 NOTE — PROGRESS NOTES
"   Specialty Pharmacy Patient Management Program  Refill Outreach     Bernie \"Murphy\" was contacted today regarding refills of their medication(s).        Delivery Questions      Flowsheet Row Most Recent Value   Delivery method UPS   Delivery address verified with patient/caregiver? Yes   Delivery address Home   Number of medications in delivery 1   Medication(s) being filled and delivered Galcanezumab-gnlm (Emgality)   Doses left of specialty medications 1   Copay verified? Yes   Copay amount $35   Copay form of payment Credit/debit on file   Delivery Date Selection 05/21/25   Signature Required No                 Follow-up: 25 day(s)     Radha Jade [facility]  5/20/2025  08:21 EDT    "

## 2025-07-01 ENCOUNTER — SPECIALTY PHARMACY (OUTPATIENT)
Dept: NEUROLOGY | Facility: CLINIC | Age: 25
End: 2025-07-01
Payer: COMMERCIAL

## 2025-07-01 NOTE — PROGRESS NOTES
"   Specialty Pharmacy Patient Management Program  Refill Outreach     Bernie \"Jessyjyoti\" was contacted today regarding refills of their medication(s).    Refill Questions      Flowsheet Row Most Recent Value   Changes to allergies? No   Changes to medications? No   New conditions or infections since last clinic visit No   Unplanned office visit, urgent care, ED, or hospital admission in the last 4 weeks  No   How does patient/caregiver feel medication is working? Good   Financial problems or insurance changes  No   Since the previous refill, were any specialty medication doses or scheduled injections missed or delayed?  No   Does this patient require a clinical escalation to a pharmacist? No            Delivery Questions      Flowsheet Row Most Recent Value   Delivery method UPS   Delivery address verified with patient/caregiver? Yes   Delivery address Home   Number of medications in delivery 1   Medication(s) being filled and delivered Galcanezumab-gnlm (Emgality)   Doses left of specialty medications 3   Copay verified? Yes   Copay amount $35   Copay form of payment Credit/debit on file   Delivery Date Selection 07/02/25   Signature Required No                 Follow-up: 28  day(s)     Jose Weaver, Pharmacy Technician  7/1/2025  12:27 EDT    " Render Note In Bullet Format When Appropriate: No

## 2025-07-28 ENCOUNTER — SPECIALTY PHARMACY (OUTPATIENT)
Dept: NEUROLOGY | Facility: CLINIC | Age: 25
End: 2025-07-28
Payer: COMMERCIAL

## 2025-07-28 NOTE — PROGRESS NOTES
"   Specialty Pharmacy Patient Management Program  Refill Outreach     Bernie \"Jessyjyoti\" was contacted today regarding refills of their medication(s).    Refill Questions      Flowsheet Row Most Recent Value   Changes to allergies? No   Changes to medications? No   New conditions or infections since last clinic visit No   Unplanned office visit, urgent care, ED, or hospital admission in the last 4 weeks  No   How does patient/caregiver feel medication is working? Good   Financial problems or insurance changes  No   Since the previous refill, were any specialty medication doses or scheduled injections missed or delayed?  No   Does this patient require a clinical escalation to a pharmacist? No            Delivery Questions      Flowsheet Row Most Recent Value   Delivery method UPS   Delivery address verified with patient/caregiver? Yes   Delivery address Home   Number of medications in delivery 1   Medication(s) being filled and delivered Galcanezumab-gnlm (Emgality)   Doses left of specialty medications 3   Copay verified? Yes   Copay amount $35   Copay form of payment Credit/debit on file   Delivery Date Selection 07/29/25   Signature Required No                 Follow-up: 28 day(s)     Jose Weaver, Pharmacy Technician  7/28/2025  09:47 EDT    "

## 2025-08-11 ENCOUNTER — OFFICE VISIT (OUTPATIENT)
Dept: NEUROLOGY | Facility: CLINIC | Age: 25
End: 2025-08-11

## 2025-08-11 VITALS
DIASTOLIC BLOOD PRESSURE: 62 MMHG | HEART RATE: 66 BPM | HEIGHT: 63 IN | SYSTOLIC BLOOD PRESSURE: 110 MMHG | WEIGHT: 111 LBS | OXYGEN SATURATION: 99 % | BODY MASS INDEX: 19.67 KG/M2

## 2025-08-11 DIAGNOSIS — G43.E09 CHRONIC MIGRAINE WITH AURA WITHOUT STATUS MIGRAINOSUS, NOT INTRACTABLE: Primary | ICD-10-CM

## 2025-08-12 ENCOUNTER — TELEPHONE (OUTPATIENT)
Dept: NEUROLOGY | Facility: CLINIC | Age: 25
End: 2025-08-12
Payer: MEDICARE